# Patient Record
Sex: MALE | Race: BLACK OR AFRICAN AMERICAN | Employment: UNEMPLOYED | URBAN - NONMETROPOLITAN AREA
[De-identification: names, ages, dates, MRNs, and addresses within clinical notes are randomized per-mention and may not be internally consistent; named-entity substitution may affect disease eponyms.]

---

## 2024-07-29 DIAGNOSIS — M17.12 PRIMARY OSTEOARTHRITIS OF LEFT KNEE: Primary | ICD-10-CM

## 2024-07-29 RX ORDER — LIDOCAINE HYDROCHLORIDE 10 MG/ML
8 INJECTION, SOLUTION INFILTRATION; PERINEURAL ONCE
Status: CANCELLED | OUTPATIENT
Start: 2024-07-29 | End: 2024-07-29

## 2024-07-29 RX ORDER — TRIAMCINOLONE ACETONIDE 40 MG/ML
80 INJECTION, SUSPENSION INTRA-ARTICULAR; INTRAMUSCULAR ONCE
Status: CANCELLED | OUTPATIENT
Start: 2024-07-29 | End: 2024-07-29

## 2024-07-29 NOTE — PROGRESS NOTES
Patient ID:  Geovanny Isaac is a 59 y.o. male who presents today for evaluation of left knee pain.    Injury: no  Metal Allergy: no    Location: left knee pain, located on the global aspect of the knee  Pain: yes; 8 on a scale of 1 to 10  Onset: gradual  Duration: 5 years  Frequency:  occurs daily  Quality: aching and boring   Swelling: patient notes intermittent swelling of the joint  Aggravating factors: weight bearing activity, standing, and walking  Alleviating factors: removing weight from leg and rest  Mechanical symptoms: none  Radiation: no    Activities: walking independently  Restriction:  decreased ambulatory tolerance  Progression:  worsening    Previous treatment:  anti-inflammatories, steroid injection , and viscosupplementation   NSAIDs:  ibuprofen/motrin and naproxyn  PT:  none    Medications:    No current outpatient medications on file prior to visit.     No current facility-administered medications on file prior to visit.       Allergies:    Not on File    Past Medical History:    No past medical history on file.    Past Surgical History:    No past surgical history on file.    Social History:    Social History     Socioeconomic History    Marital status: Single     Spouse name: Not on file    Number of children: Not on file    Years of education: Not on file    Highest education level: Not on file   Occupational History    Not on file   Tobacco Use    Smoking status: Not on file    Smokeless tobacco: Not on file   Substance and Sexual Activity    Alcohol use: Not on file    Drug use: Not on file    Sexual activity: Not on file   Other Topics Concern    Not on file   Social History Narrative    Not on file     Social Determinants of Health     Financial Resource Strain: Not on file   Food Insecurity: Not on file   Transportation Needs: Not on file   Physical Activity: Not on file   Stress: Not on file   Social Connections: Not on file   Intimate Partner Violence: Not on file   Housing Stability: Not

## 2024-07-30 ENCOUNTER — OFFICE VISIT (OUTPATIENT)
Dept: ORTHOPEDIC SURGERY | Age: 59
End: 2024-07-30
Payer: MEDICARE

## 2024-07-30 VITALS
HEART RATE: 89 BPM | OXYGEN SATURATION: 97 % | TEMPERATURE: 97.3 F | BODY MASS INDEX: 38.6 KG/M2 | WEIGHT: 285 LBS | HEIGHT: 72 IN

## 2024-07-30 DIAGNOSIS — M17.12 PRIMARY OSTEOARTHRITIS OF LEFT KNEE: Primary | ICD-10-CM

## 2024-07-30 PROCEDURE — 99204 OFFICE O/P NEW MOD 45 MIN: CPT | Performed by: ORTHOPAEDIC SURGERY

## 2024-07-30 PROCEDURE — 1036F TOBACCO NON-USER: CPT | Performed by: ORTHOPAEDIC SURGERY

## 2024-07-30 PROCEDURE — G8427 DOCREV CUR MEDS BY ELIG CLIN: HCPCS | Performed by: ORTHOPAEDIC SURGERY

## 2024-07-30 PROCEDURE — G8419 CALC BMI OUT NRM PARAM NOF/U: HCPCS | Performed by: ORTHOPAEDIC SURGERY

## 2024-07-30 PROCEDURE — 3017F COLORECTAL CA SCREEN DOC REV: CPT | Performed by: ORTHOPAEDIC SURGERY

## 2024-07-30 RX ORDER — LEVOTHYROXINE SODIUM 0.05 MG/1
TABLET ORAL
COMMUNITY
Start: 2018-01-12

## 2024-07-30 RX ORDER — GABAPENTIN 600 MG/1
600 TABLET ORAL 2 TIMES DAILY
COMMUNITY
Start: 2019-03-22 | End: 2024-08-11

## 2024-07-30 RX ORDER — DOXYCYCLINE HYCLATE 100 MG
100 TABLET ORAL EVERY 12 HOURS
COMMUNITY
Start: 2022-11-29

## 2024-07-30 RX ORDER — ALLOPURINOL 100 MG/1
100 TABLET ORAL DAILY
COMMUNITY

## 2024-07-30 RX ORDER — ROSUVASTATIN CALCIUM 20 MG/1
20 TABLET, COATED ORAL DAILY
COMMUNITY
Start: 2024-02-27 | End: 2024-08-25

## 2024-07-30 RX ORDER — POTASSIUM CHLORIDE 1500 MG/1
20 TABLET, EXTENDED RELEASE ORAL DAILY
COMMUNITY
Start: 2019-03-22 | End: 2024-11-12

## 2024-07-30 RX ORDER — ALBUTEROL SULFATE 90 UG/1
1 AEROSOL, METERED RESPIRATORY (INHALATION) EVERY 4 HOURS PRN
COMMUNITY
Start: 2023-06-30

## 2024-07-30 RX ORDER — PREDNISONE 20 MG/1
TABLET ORAL
COMMUNITY
Start: 2024-06-15

## 2024-07-30 RX ORDER — TRIAMTERENE AND HYDROCHLOROTHIAZIDE 75; 50 MG/1; MG/1
1 TABLET ORAL EVERY MORNING
COMMUNITY
Start: 2024-04-09 | End: 2024-10-06

## 2024-07-30 RX ORDER — NAPROXEN 500 MG/1
1 TABLET ORAL DAILY
COMMUNITY
Start: 2023-12-11

## 2024-07-30 ASSESSMENT — ENCOUNTER SYMPTOMS
ABDOMINAL PAIN: 0
DIARRHEA: 0
EYE DISCHARGE: 0
SHORTNESS OF BREATH: 0
CONSTIPATION: 0
EYE PAIN: 0
EYE ITCHING: 0
COUGH: 0

## 2024-08-08 ENCOUNTER — PREP FOR PROCEDURE (OUTPATIENT)
Dept: ORTHOPEDIC SURGERY | Age: 59
End: 2024-08-08

## 2024-08-08 PROBLEM — M17.12 OSTEOARTHRITIS OF LEFT KNEE: Status: ACTIVE | Noted: 2024-08-08

## 2024-08-20 ENCOUNTER — HOSPITAL ENCOUNTER (OUTPATIENT)
Dept: PREADMISSION TESTING | Age: 59
Discharge: HOME | End: 2024-08-24
Payer: MEDICARE

## 2024-08-20 ENCOUNTER — OFFICE VISIT (OUTPATIENT)
Dept: FAMILY MEDICINE CLINIC | Age: 59
End: 2024-08-20
Payer: MEDICARE

## 2024-08-20 VITALS
WEIGHT: 286.2 LBS | BODY MASS INDEX: 38.76 KG/M2 | SYSTOLIC BLOOD PRESSURE: 126 MMHG | OXYGEN SATURATION: 97 % | HEART RATE: 102 BPM | DIASTOLIC BLOOD PRESSURE: 78 MMHG | HEIGHT: 72 IN

## 2024-08-20 DIAGNOSIS — N30.90 CYSTITIS: ICD-10-CM

## 2024-08-20 DIAGNOSIS — Z01.818 PRE-OP EXAMINATION: Primary | ICD-10-CM

## 2024-08-20 DIAGNOSIS — R73.9 ELEVATED BLOOD SUGAR LEVEL: ICD-10-CM

## 2024-08-20 LAB
ANION GAP SERPL CALCULATED.3IONS-SCNC: 12 MEQ/L (ref 9–15)
ANISOCYTOSIS BLD QL SMEAR: ABNORMAL
BASOPHILS # BLD: 0.1 K/UL (ref 0–0.2)
BASOPHILS NFR BLD: 0.7 %
BILIRUB UR QL STRIP: NEGATIVE
BUN SERPL-MCNC: 41 MG/DL (ref 6–20)
CALCIUM SERPL-MCNC: 9.8 MG/DL (ref 8.5–9.9)
CHLORIDE SERPL-SCNC: 95 MEQ/L (ref 95–107)
CLARITY UR: CLEAR
CO2 SERPL-SCNC: 28 MEQ/L (ref 20–31)
COLOR UR: YELLOW
CREAT SERPL-MCNC: 1.83 MG/DL (ref 0.7–1.2)
EOSINOPHIL # BLD: 0.5 K/UL (ref 0–0.7)
EOSINOPHIL NFR BLD: 4.9 %
ERYTHROCYTE [DISTWIDTH] IN BLOOD BY AUTOMATED COUNT: 15.7 % (ref 11.5–14.5)
GLUCOSE SERPL-MCNC: 99 MG/DL (ref 70–99)
GLUCOSE UR STRIP-MCNC: NEGATIVE MG/DL
HCT VFR BLD AUTO: 44.5 % (ref 42–52)
HGB BLD-MCNC: 13.9 G/DL (ref 14–18)
HGB UR QL STRIP: NEGATIVE
INR PPP: 1
KETONES UR STRIP-MCNC: NEGATIVE MG/DL
LEUKOCYTE ESTERASE UR QL STRIP: NEGATIVE
LYMPHOCYTES # BLD: 3.3 K/UL (ref 1–4.8)
LYMPHOCYTES NFR BLD: 32.7 %
MACROCYTES BLD QL SMEAR: ABNORMAL
MCH RBC QN AUTO: 22.9 PG (ref 27–31.3)
MCHC RBC AUTO-ENTMCNC: 31.2 % (ref 33–37)
MCV RBC AUTO: 73.2 FL (ref 79–92.2)
MONOCYTES # BLD: 0.6 K/UL (ref 0.2–0.8)
MONOCYTES NFR BLD: 5.5 %
NEUTROPHILS # BLD: 5.7 K/UL (ref 1.4–6.5)
NEUTS SEG NFR BLD: 55.9 %
NITRITE UR QL STRIP: NEGATIVE
OVALOCYTES BLD QL SMEAR: ABNORMAL
PH UR STRIP: 6.5 [PH] (ref 5–9)
PLATELET # BLD AUTO: 351 K/UL (ref 130–400)
PLATELET BLD QL SMEAR: ADEQUATE
POIKILOCYTOSIS BLD QL SMEAR: ABNORMAL
POLYCHROMASIA BLD QL SMEAR: ABNORMAL
POTASSIUM SERPL-SCNC: 3.2 MEQ/L (ref 3.4–4.9)
PROT UR STRIP-MCNC: NEGATIVE MG/DL
PROTHROMBIN TIME: 13.5 SEC (ref 12.3–14.9)
RBC # BLD AUTO: 6.08 M/UL (ref 4.7–6.1)
SLIDE REVIEW: ABNORMAL
SODIUM SERPL-SCNC: 135 MEQ/L (ref 135–144)
SP GR UR STRIP: 1.01 (ref 1–1.03)
TARGETS BLD QL SMEAR: ABNORMAL
UROBILINOGEN UR STRIP-ACNC: 0.2 E.U./DL
WBC # BLD AUTO: 10.2 K/UL (ref 4.8–10.8)

## 2024-08-20 PROCEDURE — 83036 HEMOGLOBIN GLYCOSYLATED A1C: CPT

## 2024-08-20 PROCEDURE — 86850 RBC ANTIBODY SCREEN: CPT

## 2024-08-20 PROCEDURE — 81003 URINALYSIS AUTO W/O SCOPE: CPT

## 2024-08-20 PROCEDURE — 99203 OFFICE O/P NEW LOW 30 MIN: CPT | Performed by: NURSE PRACTITIONER

## 2024-08-20 PROCEDURE — 85025 COMPLETE CBC W/AUTO DIFF WBC: CPT

## 2024-08-20 PROCEDURE — 86900 BLOOD TYPING SEROLOGIC ABO: CPT

## 2024-08-20 PROCEDURE — 87641 MR-STAPH DNA AMP PROBE: CPT

## 2024-08-20 PROCEDURE — 87086 URINE CULTURE/COLONY COUNT: CPT

## 2024-08-20 PROCEDURE — 86901 BLOOD TYPING SEROLOGIC RH(D): CPT

## 2024-08-20 PROCEDURE — 80048 BASIC METABOLIC PNL TOTAL CA: CPT

## 2024-08-20 PROCEDURE — 93000 ELECTROCARDIOGRAM COMPLETE: CPT | Performed by: NURSE PRACTITIONER

## 2024-08-20 PROCEDURE — 85610 PROTHROMBIN TIME: CPT

## 2024-08-20 RX ORDER — SODIUM CHLORIDE, SODIUM LACTATE, POTASSIUM CHLORIDE, CALCIUM CHLORIDE 600; 310; 30; 20 MG/100ML; MG/100ML; MG/100ML; MG/100ML
INJECTION, SOLUTION INTRAVENOUS CONTINUOUS
OUTPATIENT
Start: 2024-08-27

## 2024-08-20 RX ORDER — SODIUM CHLORIDE 0.9 % (FLUSH) 0.9 %
5-40 SYRINGE (ML) INJECTION PRN
OUTPATIENT
Start: 2024-08-27

## 2024-08-20 RX ORDER — SODIUM CHLORIDE 9 MG/ML
INJECTION, SOLUTION INTRAVENOUS PRN
OUTPATIENT
Start: 2024-08-27

## 2024-08-20 RX ORDER — CHLORHEXIDINE GLUCONATE 40 MG/ML
SOLUTION TOPICAL DAILY
Qty: 236 ML | Refills: 0 | Status: SHIPPED | OUTPATIENT
Start: 2024-08-20

## 2024-08-20 RX ORDER — SODIUM CHLORIDE 0.9 % (FLUSH) 0.9 %
5-40 SYRINGE (ML) INJECTION EVERY 12 HOURS SCHEDULED
OUTPATIENT
Start: 2024-08-27

## 2024-08-20 SDOH — ECONOMIC STABILITY: INCOME INSECURITY: HOW HARD IS IT FOR YOU TO PAY FOR THE VERY BASICS LIKE FOOD, HOUSING, MEDICAL CARE, AND HEATING?: NOT HARD AT ALL

## 2024-08-20 SDOH — ECONOMIC STABILITY: FOOD INSECURITY: WITHIN THE PAST 12 MONTHS, THE FOOD YOU BOUGHT JUST DIDN'T LAST AND YOU DIDN'T HAVE MONEY TO GET MORE.: NEVER TRUE

## 2024-08-20 SDOH — ECONOMIC STABILITY: FOOD INSECURITY: WITHIN THE PAST 12 MONTHS, YOU WORRIED THAT YOUR FOOD WOULD RUN OUT BEFORE YOU GOT MONEY TO BUY MORE.: NEVER TRUE

## 2024-08-20 ASSESSMENT — PATIENT HEALTH QUESTIONNAIRE - PHQ9
SUM OF ALL RESPONSES TO PHQ QUESTIONS 1-9: 0
2. FEELING DOWN, DEPRESSED OR HOPELESS: NOT AT ALL
1. LITTLE INTEREST OR PLEASURE IN DOING THINGS: NOT AT ALL
SUM OF ALL RESPONSES TO PHQ9 QUESTIONS 1 & 2: 0
SUM OF ALL RESPONSES TO PHQ QUESTIONS 1-9: 0

## 2024-08-20 NOTE — PROGRESS NOTES
Subjective  Geovanny Isaac, 59 y.o. male presents today with:  Chief Complaint   Patient presents with    Pre-op Exam     Patient is scheduled for left total knee arthroplasty - 8/27/24 . Dr. Melton        I reviewed staff HPI/chief complaint and do agree with above    Geovanny Isaac here for preop exam for left total knee replacement, to be performed by Dr. Melton on 8/27/2024.     They have a pertinent history of  Hypothyroidism, hypertension, lymphedema, obesity, stage III kidney disease     There is no known history of heart disease or infarction. There is no recent chest pain or discomfort, palpitations, shortness of breath, WOMACK, difficulties with exercise, bilateral ankle swelling. Not taking any blood thinners.     There is no known history of obstructive or restrictive lung disease. no smoking.  No recent wheezing or use of any kind of inhalers. No recent hospitalizations for any lung-related illnesses. No recent Covid infection.     No known history of gastric issues which includes ulcers, herniations, bariatric surgeries. No recent GI bleeds     No known history of hyper or hypercoagulable states.   They are not diabetic.                Review of Systems   Constitutional:  Negative for activity change, appetite change, chills, fatigue and fever.   HENT:  Negative for congestion, ear pain, postnasal drip, rhinorrhea, sinus pressure, sinus pain, sore throat and trouble swallowing.    Eyes:  Negative for photophobia, pain, redness and visual disturbance.   Respiratory:  Negative for cough, chest tightness and shortness of breath.    Cardiovascular:  Negative for chest pain, palpitations and leg swelling.   Gastrointestinal:  Negative for abdominal pain, blood in stool, constipation, diarrhea, nausea and vomiting.   Endocrine: Negative for cold intolerance, heat intolerance, polydipsia, polyphagia and polyuria.   Musculoskeletal:  Positive for arthralgias. Negative for joint swelling and myalgias.   Skin:

## 2024-08-21 LAB
ABO + RH BLD: NORMAL
BACTERIA UR CULT: NORMAL
BLD GP AB SCN SERPL QL: NORMAL
ESTIMATED AVERAGE GLUCOSE: 108 MG/DL
HBA1C MFR BLD: 5.4 % (ref 4–6)
MRSA, DNA, NASAL: NEGATIVE
SPECIMEN DESCRIPTION: NORMAL

## 2024-08-21 ASSESSMENT — ENCOUNTER SYMPTOMS
COUGH: 0
DIARRHEA: 0
CONSTIPATION: 0
EYE REDNESS: 0
EYE PAIN: 0
BLOOD IN STOOL: 0
COLOR CHANGE: 0
VOMITING: 0
PHOTOPHOBIA: 0
SINUS PRESSURE: 0
CHEST TIGHTNESS: 0
RHINORRHEA: 0
ABDOMINAL PAIN: 0
SORE THROAT: 0
SINUS PAIN: 0
NAUSEA: 0
SHORTNESS OF BREATH: 0
TROUBLE SWALLOWING: 0

## 2024-08-22 NOTE — PATIENT INSTRUCTIONS
-please ensure that blood work is done at least 3 days before your surgery at the preadmission testing site (located at surgery check in - Outpatient Center - entrance B)     -stop taking asprin and motrin until after your surgery. All blood thinners need to be stopped at least 5 days in advance prior to surgery. If you experiencing pain, the only medication you can take for that is tylenol 3-4x / day not to exceed 4000 mg.    -The morning of surgery, hold all regular medications aside from Synthroid can take the morning of surgery with sip of water     -Hibiclens was sent to your pharmacy to . Please follow the instructions provided with the prescription and use daily starting 5 days before surgery.      -no eating / drinking the after midnight the night before surgery. Sips of water the morning of for all other medications is OK     -our surgery department will reach out the you the day before your surgery and let you know what time to arrive at the Outpatient Center (door B, same place as where you got blood work)

## 2024-08-27 ENCOUNTER — APPOINTMENT (OUTPATIENT)
Dept: GENERAL RADIOLOGY | Age: 59
End: 2024-08-27
Attending: ORTHOPAEDIC SURGERY
Payer: MEDICARE

## 2024-08-27 ENCOUNTER — ANESTHESIA EVENT (OUTPATIENT)
Dept: OPERATING ROOM | Age: 59
End: 2024-08-27
Payer: MEDICARE

## 2024-08-27 ENCOUNTER — HOSPITAL ENCOUNTER (OUTPATIENT)
Age: 59
Setting detail: OBSERVATION
Discharge: HOME HEALTH | End: 2024-08-28
Attending: ORTHOPAEDIC SURGERY | Admitting: ORTHOPAEDIC SURGERY
Payer: MEDICARE

## 2024-08-27 ENCOUNTER — APPOINTMENT (OUTPATIENT)
Dept: ULTRASOUND IMAGING | Age: 59
End: 2024-08-27
Attending: ORTHOPAEDIC SURGERY
Payer: MEDICARE

## 2024-08-27 ENCOUNTER — ANESTHESIA (OUTPATIENT)
Dept: OPERATING ROOM | Age: 59
End: 2024-08-27
Payer: MEDICARE

## 2024-08-27 DIAGNOSIS — G89.18 POST-OP PAIN: ICD-10-CM

## 2024-08-27 DIAGNOSIS — Z96.652 STATUS POST TOTAL LEFT KNEE REPLACEMENT: Primary | ICD-10-CM

## 2024-08-27 DIAGNOSIS — M17.12 OSTEOARTHRITIS OF LEFT KNEE: ICD-10-CM

## 2024-08-27 PROCEDURE — 6360000002 HC RX W HCPCS: Performed by: STUDENT IN AN ORGANIZED HEALTH CARE EDUCATION/TRAINING PROGRAM

## 2024-08-27 PROCEDURE — 3600000014 HC SURGERY LEVEL 4 ADDTL 15MIN: Performed by: ORTHOPAEDIC SURGERY

## 2024-08-27 PROCEDURE — 94664 DEMO&/EVAL PT USE INHALER: CPT

## 2024-08-27 PROCEDURE — 3600000004 HC SURGERY LEVEL 4 BASE: Performed by: ORTHOPAEDIC SURGERY

## 2024-08-27 PROCEDURE — C1776 JOINT DEVICE (IMPLANTABLE): HCPCS | Performed by: ORTHOPAEDIC SURGERY

## 2024-08-27 PROCEDURE — 97162 PT EVAL MOD COMPLEX 30 MIN: CPT

## 2024-08-27 PROCEDURE — 64447 NJX AA&/STRD FEMORAL NRV IMG: CPT | Performed by: STUDENT IN AN ORGANIZED HEALTH CARE EDUCATION/TRAINING PROGRAM

## 2024-08-27 PROCEDURE — 3700000001 HC ADD 15 MINUTES (ANESTHESIA): Performed by: ORTHOPAEDIC SURGERY

## 2024-08-27 PROCEDURE — 6370000000 HC RX 637 (ALT 250 FOR IP): Performed by: INTERNAL MEDICINE

## 2024-08-27 PROCEDURE — 7100000000 HC PACU RECOVERY - FIRST 15 MIN: Performed by: ORTHOPAEDIC SURGERY

## 2024-08-27 PROCEDURE — 64999 UNLISTED PX NERVOUS SYSTEM: CPT | Performed by: STUDENT IN AN ORGANIZED HEALTH CARE EDUCATION/TRAINING PROGRAM

## 2024-08-27 PROCEDURE — 73560 X-RAY EXAM OF KNEE 1 OR 2: CPT

## 2024-08-27 PROCEDURE — 2720000010 HC SURG SUPPLY STERILE: Performed by: ORTHOPAEDIC SURGERY

## 2024-08-27 PROCEDURE — 2709999900 HC NON-CHARGEABLE SUPPLY: Performed by: ORTHOPAEDIC SURGERY

## 2024-08-27 PROCEDURE — 3700000000 HC ANESTHESIA ATTENDED CARE: Performed by: ORTHOPAEDIC SURGERY

## 2024-08-27 PROCEDURE — 2580000003 HC RX 258: Performed by: NURSE PRACTITIONER

## 2024-08-27 PROCEDURE — 2580000003 HC RX 258: Performed by: PHYSICIAN ASSISTANT

## 2024-08-27 PROCEDURE — 2500000003 HC RX 250 WO HCPCS: Performed by: NURSE PRACTITIONER

## 2024-08-27 PROCEDURE — 6360000002 HC RX W HCPCS: Performed by: NURSE PRACTITIONER

## 2024-08-27 PROCEDURE — 6370000000 HC RX 637 (ALT 250 FOR IP): Performed by: NURSE PRACTITIONER

## 2024-08-27 PROCEDURE — 97530 THERAPEUTIC ACTIVITIES: CPT

## 2024-08-27 PROCEDURE — 2580000003 HC RX 258: Performed by: ORTHOPAEDIC SURGERY

## 2024-08-27 PROCEDURE — A4217 STERILE WATER/SALINE, 500 ML: HCPCS | Performed by: ORTHOPAEDIC SURGERY

## 2024-08-27 PROCEDURE — 6360000002 HC RX W HCPCS: Performed by: PHYSICIAN ASSISTANT

## 2024-08-27 PROCEDURE — G0378 HOSPITAL OBSERVATION PER HR: HCPCS

## 2024-08-27 PROCEDURE — 7100000001 HC PACU RECOVERY - ADDTL 15 MIN: Performed by: ORTHOPAEDIC SURGERY

## 2024-08-27 PROCEDURE — 2500000003 HC RX 250 WO HCPCS: Performed by: STUDENT IN AN ORGANIZED HEALTH CARE EDUCATION/TRAINING PROGRAM

## 2024-08-27 PROCEDURE — 6360000002 HC RX W HCPCS: Performed by: ORTHOPAEDIC SURGERY

## 2024-08-27 PROCEDURE — 76942 ECHO GUIDE FOR BIOPSY: CPT

## 2024-08-27 DEVICE — TIBIAL BEARING INSERT - PS
Type: IMPLANTABLE DEVICE | Site: KNEE | Status: FUNCTIONAL
Brand: TRIATHLON

## 2024-08-27 DEVICE — COMPONENT PART KNEE CAPPED K1 STRYKER: Type: IMPLANTABLE DEVICE | Site: KNEE | Status: FUNCTIONAL

## 2024-08-27 DEVICE — ASYMMETRIC PATELLA
Type: IMPLANTABLE DEVICE | Site: KNEE | Status: FUNCTIONAL
Brand: TRIATHLON

## 2024-08-27 DEVICE — CEMENT BNE RADIOPAQUE FAST SET ACRYL RESIN HI VISC SIMPLEXHV: Type: IMPLANTABLE DEVICE | Site: KNEE | Status: FUNCTIONAL

## 2024-08-27 DEVICE — UNIVERSAL TIBIAL BASEPLATE
Type: IMPLANTABLE DEVICE | Site: KNEE | Status: FUNCTIONAL
Brand: TRIATHLON

## 2024-08-27 DEVICE — POSTERIOR STABILIZED FEMORAL
Type: IMPLANTABLE DEVICE | Site: KNEE | Status: FUNCTIONAL
Brand: TRIATHLON

## 2024-08-27 RX ORDER — SODIUM CHLORIDE 0.9 % (FLUSH) 0.9 %
5-40 SYRINGE (ML) INJECTION PRN
Status: DISCONTINUED | OUTPATIENT
Start: 2024-08-27 | End: 2024-08-27 | Stop reason: HOSPADM

## 2024-08-27 RX ORDER — ROPIVACAINE HYDROCHLORIDE 5 MG/ML
INJECTION, SOLUTION EPIDURAL; INFILTRATION; PERINEURAL
Status: COMPLETED | OUTPATIENT
Start: 2024-08-27 | End: 2024-08-27

## 2024-08-27 RX ORDER — OXYCODONE HYDROCHLORIDE 5 MG/1
5 TABLET ORAL EVERY 4 HOURS PRN
Status: DISCONTINUED | OUTPATIENT
Start: 2024-08-27 | End: 2024-08-28 | Stop reason: HOSPADM

## 2024-08-27 RX ORDER — LIDOCAINE HYDROCHLORIDE 10 MG/ML
INJECTION, SOLUTION INFILTRATION; PERINEURAL
Status: COMPLETED | OUTPATIENT
Start: 2024-08-27 | End: 2024-08-27

## 2024-08-27 RX ORDER — SODIUM CHLORIDE, SODIUM LACTATE, POTASSIUM CHLORIDE, CALCIUM CHLORIDE 600; 310; 30; 20 MG/100ML; MG/100ML; MG/100ML; MG/100ML
INJECTION, SOLUTION INTRAVENOUS CONTINUOUS
Status: DISCONTINUED | OUTPATIENT
Start: 2024-08-27 | End: 2024-08-27 | Stop reason: HOSPADM

## 2024-08-27 RX ORDER — OXYCODONE HYDROCHLORIDE 5 MG/1
5 TABLET ORAL PRN
Status: DISCONTINUED | OUTPATIENT
Start: 2024-08-27 | End: 2024-08-27 | Stop reason: HOSPADM

## 2024-08-27 RX ORDER — SODIUM CHLORIDE 9 MG/ML
INJECTION, SOLUTION INTRAVENOUS CONTINUOUS
Status: DISCONTINUED | OUTPATIENT
Start: 2024-08-27 | End: 2024-08-28

## 2024-08-27 RX ORDER — PROCHLORPERAZINE EDISYLATE 5 MG/ML
5 INJECTION INTRAMUSCULAR; INTRAVENOUS
Status: DISCONTINUED | OUTPATIENT
Start: 2024-08-27 | End: 2024-08-27 | Stop reason: HOSPADM

## 2024-08-27 RX ORDER — OXYCODONE HCL 10 MG/1
10 TABLET, FILM COATED, EXTENDED RELEASE ORAL ONCE
Status: COMPLETED | OUTPATIENT
Start: 2024-08-27 | End: 2024-08-27

## 2024-08-27 RX ORDER — SODIUM CHLORIDE 9 MG/ML
INJECTION, SOLUTION INTRAVENOUS PRN
Status: DISCONTINUED | OUTPATIENT
Start: 2024-08-27 | End: 2024-08-27 | Stop reason: HOSPADM

## 2024-08-27 RX ORDER — DIPHENHYDRAMINE HYDROCHLORIDE 50 MG/ML
12.5 INJECTION INTRAMUSCULAR; INTRAVENOUS
Status: DISCONTINUED | OUTPATIENT
Start: 2024-08-27 | End: 2024-08-27 | Stop reason: HOSPADM

## 2024-08-27 RX ORDER — SODIUM CHLORIDE 0.9 % (FLUSH) 0.9 %
5-40 SYRINGE (ML) INJECTION EVERY 12 HOURS SCHEDULED
Status: DISCONTINUED | OUTPATIENT
Start: 2024-08-27 | End: 2024-08-27 | Stop reason: HOSPADM

## 2024-08-27 RX ORDER — ROSUVASTATIN CALCIUM 20 MG/1
20 TABLET, COATED ORAL NIGHTLY
Status: DISCONTINUED | OUTPATIENT
Start: 2024-08-27 | End: 2024-08-28 | Stop reason: HOSPADM

## 2024-08-27 RX ORDER — OXYCODONE HYDROCHLORIDE 5 MG/1
2.5 TABLET ORAL EVERY 4 HOURS PRN
Status: DISCONTINUED | OUTPATIENT
Start: 2024-08-27 | End: 2024-08-28 | Stop reason: HOSPADM

## 2024-08-27 RX ORDER — HYDRALAZINE HYDROCHLORIDE 20 MG/ML
10 INJECTION INTRAMUSCULAR; INTRAVENOUS
Status: DISCONTINUED | OUTPATIENT
Start: 2024-08-27 | End: 2024-08-27 | Stop reason: HOSPADM

## 2024-08-27 RX ORDER — ACETAMINOPHEN 325 MG/1
650 TABLET ORAL EVERY 6 HOURS
Status: DISCONTINUED | OUTPATIENT
Start: 2024-08-27 | End: 2024-08-28 | Stop reason: HOSPADM

## 2024-08-27 RX ORDER — PROPOFOL 10 MG/ML
INJECTION, EMULSION INTRAVENOUS CONTINUOUS PRN
Status: DISCONTINUED | OUTPATIENT
Start: 2024-08-27 | End: 2024-08-27 | Stop reason: SDUPTHER

## 2024-08-27 RX ORDER — MAGNESIUM HYDROXIDE 1200 MG/15ML
LIQUID ORAL CONTINUOUS PRN
Status: DISCONTINUED | OUTPATIENT
Start: 2024-08-27 | End: 2024-08-27 | Stop reason: HOSPADM

## 2024-08-27 RX ORDER — MIDAZOLAM HYDROCHLORIDE 1 MG/ML
INJECTION INTRAMUSCULAR; INTRAVENOUS
Status: COMPLETED | OUTPATIENT
Start: 2024-08-27 | End: 2024-08-27

## 2024-08-27 RX ORDER — MEPERIDINE HYDROCHLORIDE 25 MG/ML
12.5 INJECTION INTRAMUSCULAR; INTRAVENOUS; SUBCUTANEOUS EVERY 5 MIN PRN
Status: DISCONTINUED | OUTPATIENT
Start: 2024-08-27 | End: 2024-08-27 | Stop reason: HOSPADM

## 2024-08-27 RX ORDER — ONDANSETRON 2 MG/ML
4 INJECTION INTRAMUSCULAR; INTRAVENOUS
Status: DISCONTINUED | OUTPATIENT
Start: 2024-08-27 | End: 2024-08-27 | Stop reason: HOSPADM

## 2024-08-27 RX ORDER — LEVOTHYROXINE SODIUM 25 UG/1
50 TABLET ORAL DAILY
Status: DISCONTINUED | OUTPATIENT
Start: 2024-08-27 | End: 2024-08-28 | Stop reason: HOSPADM

## 2024-08-27 RX ORDER — OXYCODONE HYDROCHLORIDE 5 MG/1
10 TABLET ORAL PRN
Status: DISCONTINUED | OUTPATIENT
Start: 2024-08-27 | End: 2024-08-27 | Stop reason: HOSPADM

## 2024-08-27 RX ORDER — FENTANYL CITRATE 0.05 MG/ML
50 INJECTION, SOLUTION INTRAMUSCULAR; INTRAVENOUS EVERY 5 MIN PRN
Status: DISCONTINUED | OUTPATIENT
Start: 2024-08-27 | End: 2024-08-27 | Stop reason: HOSPADM

## 2024-08-27 RX ORDER — ALLOPURINOL 100 MG/1
100 TABLET ORAL DAILY
Status: DISCONTINUED | OUTPATIENT
Start: 2024-08-27 | End: 2024-08-28 | Stop reason: HOSPADM

## 2024-08-27 RX ORDER — NALOXONE HYDROCHLORIDE 0.4 MG/ML
INJECTION, SOLUTION INTRAMUSCULAR; INTRAVENOUS; SUBCUTANEOUS PRN
Status: DISCONTINUED | OUTPATIENT
Start: 2024-08-27 | End: 2024-08-27 | Stop reason: HOSPADM

## 2024-08-27 RX ORDER — CELECOXIB 100 MG/1
200 CAPSULE ORAL ONCE
Status: COMPLETED | OUTPATIENT
Start: 2024-08-27 | End: 2024-08-27

## 2024-08-27 RX ORDER — SODIUM CHLORIDE 0.9 % (FLUSH) 0.9 %
5-40 SYRINGE (ML) INJECTION EVERY 12 HOURS SCHEDULED
Status: DISCONTINUED | OUTPATIENT
Start: 2024-08-27 | End: 2024-08-28 | Stop reason: HOSPADM

## 2024-08-27 RX ORDER — ACETAMINOPHEN 500 MG
1000 TABLET ORAL ONCE
Status: COMPLETED | OUTPATIENT
Start: 2024-08-27 | End: 2024-08-27

## 2024-08-27 RX ORDER — KETOROLAC TROMETHAMINE 15 MG/ML
7.5 INJECTION, SOLUTION INTRAMUSCULAR; INTRAVENOUS EVERY 6 HOURS
Status: COMPLETED | OUTPATIENT
Start: 2024-08-27 | End: 2024-08-28

## 2024-08-27 RX ORDER — MORPHINE SULFATE 2 MG/ML
2 INJECTION, SOLUTION INTRAMUSCULAR; INTRAVENOUS
Status: DISCONTINUED | OUTPATIENT
Start: 2024-08-27 | End: 2024-08-28 | Stop reason: HOSPADM

## 2024-08-27 RX ORDER — FERROUS SULFATE 325(65) MG
325 TABLET ORAL DAILY
Status: DISCONTINUED | OUTPATIENT
Start: 2024-08-27 | End: 2024-08-28 | Stop reason: HOSPADM

## 2024-08-27 RX ORDER — SODIUM CHLORIDE 9 MG/ML
INJECTION, SOLUTION INTRAVENOUS PRN
Status: DISCONTINUED | OUTPATIENT
Start: 2024-08-27 | End: 2024-08-28 | Stop reason: HOSPADM

## 2024-08-27 RX ORDER — SODIUM CHLORIDE 0.9 % (FLUSH) 0.9 %
5-40 SYRINGE (ML) INJECTION PRN
Status: DISCONTINUED | OUTPATIENT
Start: 2024-08-27 | End: 2024-08-28 | Stop reason: HOSPADM

## 2024-08-27 RX ORDER — ONDANSETRON 4 MG/1
4 TABLET, ORALLY DISINTEGRATING ORAL EVERY 8 HOURS PRN
Status: DISCONTINUED | OUTPATIENT
Start: 2024-08-27 | End: 2024-08-28 | Stop reason: HOSPADM

## 2024-08-27 RX ORDER — ASPIRIN 81 MG/1
81 TABLET ORAL 2 TIMES DAILY
Status: DISCONTINUED | OUTPATIENT
Start: 2024-08-27 | End: 2024-08-28 | Stop reason: HOSPADM

## 2024-08-27 RX ORDER — LABETALOL HYDROCHLORIDE 5 MG/ML
10 INJECTION, SOLUTION INTRAVENOUS
Status: DISCONTINUED | OUTPATIENT
Start: 2024-08-27 | End: 2024-08-27 | Stop reason: HOSPADM

## 2024-08-27 RX ORDER — ONDANSETRON 2 MG/ML
4 INJECTION INTRAMUSCULAR; INTRAVENOUS EVERY 6 HOURS PRN
Status: DISCONTINUED | OUTPATIENT
Start: 2024-08-27 | End: 2024-08-28 | Stop reason: HOSPADM

## 2024-08-27 RX ORDER — HYDROXYZINE HYDROCHLORIDE 10 MG/1
10 TABLET, FILM COATED ORAL 3 TIMES DAILY
Status: DISCONTINUED | OUTPATIENT
Start: 2024-08-27 | End: 2024-08-28 | Stop reason: HOSPADM

## 2024-08-27 RX ORDER — ALBUTEROL SULFATE 90 UG/1
1 AEROSOL, METERED RESPIRATORY (INHALATION) EVERY 4 HOURS PRN
Status: DISCONTINUED | OUTPATIENT
Start: 2024-08-27 | End: 2024-08-28 | Stop reason: HOSPADM

## 2024-08-27 RX ORDER — BUPIVACAINE HYDROCHLORIDE 7.5 MG/ML
INJECTION, SOLUTION INTRASPINAL
Status: COMPLETED | OUTPATIENT
Start: 2024-08-27 | End: 2024-08-27

## 2024-08-27 RX ORDER — SENNA AND DOCUSATE SODIUM 50; 8.6 MG/1; MG/1
1 TABLET, FILM COATED ORAL 2 TIMES DAILY
Status: DISCONTINUED | OUTPATIENT
Start: 2024-08-27 | End: 2024-08-28 | Stop reason: HOSPADM

## 2024-08-27 RX ORDER — GABAPENTIN 300 MG/1
600 CAPSULE ORAL 2 TIMES DAILY
Status: DISCONTINUED | OUTPATIENT
Start: 2024-08-27 | End: 2024-08-28 | Stop reason: HOSPADM

## 2024-08-27 RX ORDER — CYCLOBENZAPRINE HCL 10 MG
10 TABLET ORAL 3 TIMES DAILY PRN
Status: DISCONTINUED | OUTPATIENT
Start: 2024-08-27 | End: 2024-08-28 | Stop reason: HOSPADM

## 2024-08-27 RX ORDER — MAGNESIUM HYDROXIDE/ALUMINUM HYDROXICE/SIMETHICONE 120; 1200; 1200 MG/30ML; MG/30ML; MG/30ML
15 SUSPENSION ORAL EVERY 6 HOURS PRN
Status: DISCONTINUED | OUTPATIENT
Start: 2024-08-27 | End: 2024-08-28 | Stop reason: HOSPADM

## 2024-08-27 RX ORDER — FENTANYL CITRATE 0.05 MG/ML
25 INJECTION, SOLUTION INTRAMUSCULAR; INTRAVENOUS EVERY 5 MIN PRN
Status: DISCONTINUED | OUTPATIENT
Start: 2024-08-27 | End: 2024-08-27 | Stop reason: HOSPADM

## 2024-08-27 RX ORDER — POTASSIUM CHLORIDE 750 MG/1
20 TABLET, EXTENDED RELEASE ORAL DAILY
Status: DISCONTINUED | OUTPATIENT
Start: 2024-08-27 | End: 2024-08-28 | Stop reason: HOSPADM

## 2024-08-27 RX ADMIN — PROPOFOL 100 MG: 10 INJECTION, EMULSION INTRAVENOUS at 11:33

## 2024-08-27 RX ADMIN — ACETAMINOPHEN 1000 MG: 500 TABLET ORAL at 09:02

## 2024-08-27 RX ADMIN — SODIUM CHLORIDE: 9 INJECTION, SOLUTION INTRAVENOUS at 15:43

## 2024-08-27 RX ADMIN — ROSUVASTATIN CALCIUM 20 MG: 20 TABLET, COATED ORAL at 20:08

## 2024-08-27 RX ADMIN — GABAPENTIN 600 MG: 300 CAPSULE ORAL at 20:08

## 2024-08-27 RX ADMIN — ASPIRIN 81 MG: 81 TABLET, COATED ORAL at 20:08

## 2024-08-27 RX ADMIN — OXYCODONE 5 MG: 5 TABLET ORAL at 20:08

## 2024-08-27 RX ADMIN — SODIUM CHLORIDE 3000 MG: 9 INJECTION, SOLUTION INTRAVENOUS at 11:41

## 2024-08-27 RX ADMIN — SODIUM CHLORIDE 3000 MG: 9 INJECTION, SOLUTION INTRAVENOUS at 20:16

## 2024-08-27 RX ADMIN — LEVOTHYROXINE SODIUM 50 MCG: 0.03 TABLET ORAL at 17:42

## 2024-08-27 RX ADMIN — KETOROLAC TROMETHAMINE 7.5 MG: 15 INJECTION, SOLUTION INTRAMUSCULAR; INTRAVENOUS at 17:42

## 2024-08-27 RX ADMIN — PROPOFOL 100 MCG/KG/MIN: 10 INJECTION, EMULSION INTRAVENOUS at 11:32

## 2024-08-27 RX ADMIN — POTASSIUM CHLORIDE 20 MEQ: 750 TABLET, EXTENDED RELEASE ORAL at 17:42

## 2024-08-27 RX ADMIN — TRANEXAMIC ACID 1000 MG: 100 INJECTION, SOLUTION INTRAVENOUS at 12:02

## 2024-08-27 RX ADMIN — HYDROXYZINE HYDROCHLORIDE 10 MG: 10 TABLET ORAL at 20:08

## 2024-08-27 RX ADMIN — ALLOPURINOL 100 MG: 100 TABLET ORAL at 17:42

## 2024-08-27 RX ADMIN — ACETAMINOPHEN 650 MG: 325 TABLET ORAL at 15:45

## 2024-08-27 RX ADMIN — BUPIVACAINE HYDROCHLORIDE IN DEXTROSE 12 MG: 7.5 INJECTION, SOLUTION SUBARACHNOID at 11:21

## 2024-08-27 RX ADMIN — CELECOXIB 200 MG: 100 CAPSULE ORAL at 09:02

## 2024-08-27 RX ADMIN — LIDOCAINE HYDROCHLORIDE 2.5 ML: 10 INJECTION, SOLUTION INFILTRATION; PERINEURAL at 10:47

## 2024-08-27 RX ADMIN — ACETAMINOPHEN 650 MG: 325 TABLET ORAL at 20:06

## 2024-08-27 RX ADMIN — SODIUM CHLORIDE, POTASSIUM CHLORIDE, SODIUM LACTATE AND CALCIUM CHLORIDE: 600; 310; 30; 20 INJECTION, SOLUTION INTRAVENOUS at 09:07

## 2024-08-27 RX ADMIN — PROPOFOL 30 MG: 10 INJECTION, EMULSION INTRAVENOUS at 12:44

## 2024-08-27 RX ADMIN — ROPIVACAINE HYDROCHLORIDE 20 ML: 5 INJECTION, SOLUTION EPIDURAL; INFILTRATION; PERINEURAL at 10:47

## 2024-08-27 RX ADMIN — LIDOCAINE HYDROCHLORIDE 2.5 ML: 10 INJECTION, SOLUTION INFILTRATION; PERINEURAL at 10:43

## 2024-08-27 RX ADMIN — KETOROLAC TROMETHAMINE 7.5 MG: 15 INJECTION, SOLUTION INTRAMUSCULAR; INTRAVENOUS at 23:09

## 2024-08-27 RX ADMIN — ROPIVACAINE HYDROCHLORIDE 25 ML: 5 INJECTION, SOLUTION EPIDURAL; INFILTRATION; PERINEURAL at 10:52

## 2024-08-27 RX ADMIN — TRANEXAMIC ACID 1000 MG: 100 INJECTION, SOLUTION INTRAVENOUS at 13:44

## 2024-08-27 RX ADMIN — OXYCODONE HYDROCHLORIDE 10 MG: 10 TABLET, FILM COATED, EXTENDED RELEASE ORAL at 09:02

## 2024-08-27 RX ADMIN — MIDAZOLAM HYDROCHLORIDE 2 MG: 2 INJECTION, SOLUTION INTRAMUSCULAR; INTRAVENOUS at 10:43

## 2024-08-27 RX ADMIN — SENNOSIDES AND DOCUSATE SODIUM 1 TABLET: 50; 8.6 TABLET ORAL at 20:08

## 2024-08-27 RX ADMIN — FERROUS SULFATE TAB 325 MG (65 MG ELEMENTAL FE) 325 MG: 325 (65 FE) TAB at 17:42

## 2024-08-27 ASSESSMENT — PAIN SCALES - GENERAL
PAINLEVEL_OUTOF10: 0
PAINLEVEL_OUTOF10: 0
PAINLEVEL_OUTOF10: 9
PAINLEVEL_OUTOF10: 0
PAINLEVEL_OUTOF10: 5

## 2024-08-27 ASSESSMENT — PAIN DESCRIPTION - DESCRIPTORS
DESCRIPTORS: ACHING;PRESSURE;TINGLING
DESCRIPTORS: ACHING;PRESSURE

## 2024-08-27 ASSESSMENT — PAIN DESCRIPTION - ORIENTATION
ORIENTATION: LEFT
ORIENTATION: LEFT

## 2024-08-27 ASSESSMENT — PAIN - FUNCTIONAL ASSESSMENT
PAIN_FUNCTIONAL_ASSESSMENT: 0-10
PAIN_FUNCTIONAL_ASSESSMENT: ACTIVITIES ARE NOT PREVENTED
PAIN_FUNCTIONAL_ASSESSMENT: ACTIVITIES ARE NOT PREVENTED

## 2024-08-27 ASSESSMENT — PAIN DESCRIPTION - LOCATION
LOCATION: KNEE
LOCATION: KNEE

## 2024-08-27 NOTE — PROGRESS NOTES
1027  anesthesia at bedside, completed time out for left saphenous and IPACk block and spinal with patient , Dr. Baez and this RN. Pt placed on 2L oxygen via NC spo2 100 %.  1036 Nerve blocks complete Pt tana good. VS obtained see EMR.

## 2024-08-27 NOTE — ANESTHESIA PROCEDURE NOTES
Peripheral Block    Patient location during procedure: pre-op  Reason for block: post-op pain management and at surgeon's request  Start time: 8/27/2024 10:43 AM  End time: 8/27/2024 10:47 AM  Staffing  Performed: anesthesiologist   Anesthesiologist: Ankit Baez MD  Performed by: Ankit Baez MD  Authorized by: Ankit Baez MD    Preanesthetic Checklist  Completed: patient identified, IV checked, site marked, risks and benefits discussed, surgical/procedural consents, equipment checked, pre-op evaluation, timeout performed, anesthesia consent given, oxygen available and monitors applied/VS acknowledged  Peripheral Block   Patient position: supine  Prep: ChloraPrep  Provider prep: mask and sterile gloves (Sterile probe cover)  Patient monitoring: cardiac monitor, continuous pulse ox, frequent blood pressure checks and IV access  Block type: Saphenous  Laterality: left  Injection technique: single-shot  Guidance: ultrasound guided  Local infiltration: lidocaine  Infiltration strength: 1 %  Local infiltration: lidocaine  Dose: 5 mL    Needle   Needle type: combined needle/nerve stimulator   Needle gauge: 22 G  Needle localization: anatomical landmarks and ultrasound guidance  Needle length: 10 cm  Assessment   Injection assessment: negative aspiration for heme, no paresthesia on injection and local visualized surrounding nerve on ultrasound  Paresthesia pain: immediately resolved  Slow fractionated injection: yes  Hemodynamics: stable  Outcomes: uncomplicated and patient tolerated procedure well    Additional Notes  Ultrasound guidance used to view needle for placement.    Ultrasound image stored,  printed and saved in patient chart.    Sterile probe cover used    Medications Administered  midazolam (VERSED) injection 2 mg/2mL - IntraVENous   2 mg - 8/27/2024 10:43:00 AM  ropivacaine (NAROPIN) injection 0.5% - Perineural   20 mL - 8/27/2024 10:47:00 AM  lidocaine injection 1% - Subcuticular   2.5 mL -  8/27/2024 10:43:00 AM

## 2024-08-27 NOTE — DISCHARGE INSTR - COC
Continuity of Care Form    Patient Name: Geovanny Isaac   :  1965  MRN:  [id number]    Admit date:  2024  Discharge date:  24    Code Status Order: Full Code   Advance Directives:   Advance Care Flowsheet Documentation        Date/Time Healthcare Directive Type of Healthcare Directive Copy in Chart Healthcare Agent Appointed Healthcare Agent's Name Healthcare Agent's Phone Number    [phone] No, patient does not have an advance directive for healthcare treatment  --  --  --  --  --                     Admitting Physician:  Leonardo Melton MD  PCP: Carri Bolanos APRN - NP    Discharging Nurse: Savanna ESPINO  Discharging Hospital Unit/Room#: 0208/0208-01  Discharging Unit Phone Number: [phone]    Emergency Contact:   Extended Emergency Contact Information  Primary Emergency Contact: Mackenzie Ragland  Mobile Phone: [phone]  Relation: Girlfriend  Preferred language: English   needed? No    Past Surgical History:  History reviewed. No pertinent surgical history.    Immunization History:     There is no immunization history on file for this patient.    Active Problems:  Patient Active Problem List   Diagnosis Code    Osteoarthritis of left knee M17.12    Status post total knee replacement, left Z96.652       Isolation/Infection:   Isolation            No Isolation          Patient Infection Status       None to display            Nurse Assessment:  Last Vital Signs: BP (!) 108/59   Pulse 70   Temp 97.7 °F (36.5 °C)   Resp 11   Ht 1.829 m (6')   Wt 129.3 kg (285 lb)   SpO2 99%   BMI 38.65 kg/m²     Last documented pain score (0-10 scale): Pain Level: 0  Last Weight:   Wt Readings from Last 1 Encounters:   24 129.3 kg (285 lb)     Mental Status:  oriented, alert, coherent, logical, and thought processes intact    IV Access:  - None    Nursing Mobility/ADLs:  Walking   Independent  Transfer  Independent  Bathing  Independent  Dressing  Independent  Toileting

## 2024-08-27 NOTE — H&P
The history and physical in the electronic medical record dated 8/20/24 was personally reviewed.  There are no interval changes that need to be made.  Plan is to proceed with a right total knee as scheduled. The patient is opted to proceed with a knee replacement surgery.  I brought the model in, and we sat down and talked about surgery.  We talked about the recovery.  I stressed the importance of physical therapy and active participation in physical therapy to the patient in order to achieve a satisfactory postoperative outcome.  We went over the risks of surgery.  Risks include, but are not limited to, infection, neurovascular injury, hematoma formation, wound healing complications, blood clot, persistent postoperative pain, ligament injury, and risks of anesthesia.  The patient expressed understanding and wishes to proceed with a total knee replacement as above.

## 2024-08-27 NOTE — PROGRESS NOTES
Facility/Department: [facility] UNIT  Physical Therapy Initial Assessment    Name: Geovanny Isaac  : 1965  MRN: [id number]  Date of Service: 2024    Discharge Recommendations:  Continue to assess pending progress, Home with Home health PT          Patient Diagnosis(es): The primary encounter diagnosis was Status post total left knee replacement. A diagnosis of Osteoarthritis of left knee was also pertinent to this visit.  Past Medical History:  has no past medical history on file.  Past Surgical History:  has no past surgical history on file.    Assessment  Body Structures, Functions, Activity Limitations Requiring Skilled Therapeutic Intervention: Decreased functional mobility ;Decreased strength;Decreased ROM  Assessment: 59 year old male I PLOF adm to [facility] for elective L TKA. Pt currently requires assist with bed mobilty and transfers. Amb not assessed this date due to LE numbness and significant fatigue, difficulty keeping eyes open during evaluation. Pt would benefit from skilled PT treatment while hospitalized to address impairments, improve transfers, amb, ROM strength and overall functional moblity to allow safe D/C home.  Treatment Diagnosis: difficulty walking, weakness S/P L TKA  Therapy Prognosis: Good  Requires PT Follow-Up: Yes    Plan  Physical Therapy Plan  General Plan: 5-7 times per week (1-2x per day)  Current Treatment Recommendations: Strengthening, ROM, Balance training, Functional mobility training, Gait training, Stair training, Home exercise program, Patient/Caregiver education & training (TKA protocol)  Safety Devices  Type of Devices: Bed alarm in place, Call light within reach, Nurse notified, Left in bed    Restrictions  Restrictions/Precautions  Restrictions/Precautions: Surgical Protocols, Weight Bearing  Lower Extremity Weight Bearing Restrictions  Left Lower Extremity Weight Bearing: Weight Bearing As Tolerated     Subjective   Pt in bed and agreeable to

## 2024-08-27 NOTE — OP NOTE
Operative Note      Patient: Geovanny Isaac  YOB: 1965  MRN: [id number]    Date of Procedure: 8/27/2024    Pre-Op Diagnosis Codes:      * Osteoarthritis of left knee [M17.12]   Also synovial chondromatosis    Post-Op Diagnosis: Same       Procedure(s):  Left total knee arthroplasty; also an aggressive synovectomy to remove the heterotopic bone/synovial, dermatosis that was all through the joint    Surgeon(s):  Leonardo Melton MD    Assistant:   First Assistant: Cyndi Purvis -her assistance consisted of assistance with positioning of the limb, retraction and closing the wound    Anesthesia: Choice    Estimated Blood Loss (mL): less than 100     Complications: None    Specimens:   ID Type Source Tests Collected by Time Destination   A : Synovium Tissue Joint, Knee SURGICAL PATHOLOGY Leonardo Melton MD 8/27/2024 1215        Implants:  Implant Name Type Inv. Item Serial No.  Lot No. LRB No. Used Action   CEMENT BNE RADIOPAQUE FAST SET ACRYL RESIN HI VISC SIMPLEXHV - [id number]  CEMENT BNE RADIOPAQUE FAST SET ACRYL RESIN HI VISC SIMPLEXHV [id number] [employer] [id number] Left 1 Implanted   INSERT TIB PS 6 16 MM KNEE 5/PK X3 TRIATHLON - [id number]  INSERT TIB PS 6 16 MM KNEE 5/PK X3 TRIATHLON  [employer] [id number] Left 1 Implanted   IMPLANT PATELLAR [id number] X3 ASYMMETRIC TRIATHLON - [id number]  IMPLANT PATELLAR [id number] X3 ASYMMETRIC TRIATHLON  [employer] [id number] Left 1 Implanted   COMPONENT FEM SZ 6 L KNEE POST STBL KJ TRIATHLON - [id number]  COMPONENT FEM SZ 6 L KNEE POST STBL KJ TRIATHLON  [employer] [id number] Left 1 Implanted   BASEPLATE TIB SZ 6 UNIV KNEE TRITANIUM TOT STBL KJ - [id number]  BASEPLATE TIB SZ 6 UNIV KNEE TRITANIUM TOT STBL KJ  [employer] [id number] Left 1 Implanted         Drains:   Negative Pressure Wound Therapy Knee Left;Anterior (Active)       Findings:  Infection Present At Time Of  and the tibial trial was pinned in the place.  The knee was taken through range of motion.   The knee extended fully and flex well.  There was no varus or valgus laxity throughout the entire arc of motion.  There was a significant increase in range of motion with removal of the heterotopic bone.  The decision was made proceed with the size components.  The femoral component was removed along with the trial polyethylene liner.  The tibia was subluxed anteriorly and the keel was prepared.  The boss reamer was inserted in.  All trial components were then removed from the knee.  The tourniquet was then reinsufflated.  All cut bony surfaces were irrigated off with normal saline from the pulse lavage and dried.  Drill holes were made in the sclerotic bone.  The cement was mixed at the back table.  The size 6 tibia was cemented into place.  All excess cement was removed from around the tibial component.  The size 6 femoral component was then cemented into place, and likewise, all excess cement was removed from around the femoral component.  A trial 16 polyethylene liner was placed into the tibia and the knee was brought out into extension and held under an axial load.  At this point in time, the 38 mm patella was cemented in place and held there with a clamp.  Once the cemented cured, the clamp was removed and the trial polyethylene liner were removed.  All excess cement was removed from the knee.  The tourniquet was let down and hemostasis was obtained once again with the aqua Rosibel bipolar sealer.  The pain cocktail was injected into the soft tissues.  The 16 mm posterior stabilized polyethylene liner was then impacted into the tibial tray thereby engaging the locking mechanism.  The knee continued to extend fully and flex well.  There was no varus or valgus laxity throughout the entire arc of motion.  The knee was copiously irrigated out with normal saline from the pulse Avage.  The decision was made proceed with

## 2024-08-27 NOTE — DISCHARGE INSTRUCTIONS
Dr. Leonardo Melton Jr., MD  [facility]    Post Operative Instructions for Total Knee Replacement    Incision and dressing  Your incision is covered with an incisional wound vac.  This is to prevent fluid build up under the skin from removal of the excess bone that was in your knee  The dressing is to be removed 7 days after the date of your surgery.  Turn off the power button and peel off gently.  The incision has been closed with skin glue.  The glue will flake off over time and fall off on its own.  DO NOT apply any lotions, creams, peroxide or Betadine to the skin glue, as it will degrade and dissolve the glue.  DO NOT peel the glue off, as this could result in opening of the incision.  There are no sutures that need to be removed; the skin and subcutaneous layers have been closed with dissolvable sutures, which will dissolve over 7 to 8 weeks.    Showering  The vac dressing is waterproof.  You may shower when you feel ready.  Once the vac dressing has been removed, you may continue to shower.  The glue provides a waterproof seal to the incision.  Let the water run over the incision, and pat dry with a towel.  Do not scrub or rub the glue.    Compression stockings  The compression stockings/BRUNO hose are used to help reduce swelling and assist in the prevention of blood clots.  They are to be worn on the operative leg for 3 weeks.  They should be worn full-time during the day, but may be removed at nighttime or during periods of elevation during the day.  They are optional on the nonoperative leg, depending on how much swelling may be encountered.    Activity  You will begin activity immediately after surgery.  Physical therapy will commence (at home or as an outpatient) within a few days of surgery.  You may bear weight on the operative leg as tolerated.  It is encouraged that you get up for short walks frequently throughout the day.  Pump your ankles up and down at least 10 times every hour while  such as Colace or Dulcolax as recommended his pain medications can lead to constipation.  Take this as needed until your bowel function is normal.    Follow-up  You will have a follow-up appointment with Dr. Melton approximately 3 weeks after the date of your surgery.    CALL THE OFFICE ([phone]) if:  You run a fever of 100 degrees or higher that will not subside with Tylenol.  You noticed drainage from the incision.  You have worsening swelling or pain that is not relieved by rest or medication.

## 2024-08-27 NOTE — ANESTHESIA PROCEDURE NOTES
Spinal Block    Patient location during procedure: pre-op  End time: 8/27/2024 11:21 AM  Reason for block: primary anesthetic  Staffing  Performed: anesthesiologist   Anesthesiologist: Ankit Baez MD  Performed by: Ankit Baez MD  Authorized by: Ankit Baez MD    Spinal Block  Patient position: sitting  Prep: Betadine  Patient monitoring: cardiac monitor, continuous pulse ox and frequent blood pressure checks  Approach: midline  Location: L4/L5  Provider prep: mask and sterile gloves  Local infiltration: lidocaine  Needle  Needle type: Pencan   Needle gauge: 24 G  Needle length: 3.5 in  Assessment  Sensory level: T6  Events: cerebrospinal fluid  Swirl obtained: Yes (CSF aspirated)  CSF: clear  Attempts: 1  Hemodynamics: stable  Additional Notes  Free flowing CSF.  Negative heme.  Negative paresthesia.  .  Preanesthetic Checklist  Completed: patient identified, IV checked, site marked, risks and benefits discussed, surgical/procedural consents, equipment checked, pre-op evaluation, timeout performed, anesthesia consent given, oxygen available and monitors applied/VS acknowledged

## 2024-08-27 NOTE — ANESTHESIA PROCEDURE NOTES
Peripheral Block    Patient location during procedure: pre-op  Reason for block: post-op pain management and at surgeon's request  Start time: 8/27/2024 10:47 AM  End time: 8/27/2024 10:52 AM  Staffing  Performed: anesthesiologist   Anesthesiologist: Ankit Baez MD  Performed by: Ankit Baez MD  Authorized by: Ankit Baez MD    Preanesthetic Checklist  Completed: patient identified, IV checked, site marked, risks and benefits discussed, surgical/procedural consents, equipment checked, pre-op evaluation, timeout performed, anesthesia consent given, oxygen available and monitors applied/VS acknowledged  Peripheral Block   Patient position: supine  Prep: ChloraPrep  Provider prep: mask and sterile gloves (Sterile probe cover)  Patient monitoring: cardiac monitor, continuous pulse ox, frequent blood pressure checks and IV access  Block type: iPacks  Laterality: left  Injection technique: single-shot  Guidance: ultrasound guided  Local infiltration: lidocaine  Infiltration strength: 1 %  Local infiltration: lidocaine  Dose: 5 mL    Needle   Needle type: combined needle/nerve stimulator   Needle gauge: 22 G  Needle localization: anatomical landmarks and ultrasound guidance  Needle length: 10 cm  Assessment   Injection assessment: negative aspiration for heme, no paresthesia on injection and local visualized surrounding nerve on ultrasound  Paresthesia pain: immediately resolved  Slow fractionated injection: yes  Hemodynamics: stable  Outcomes: uncomplicated and patient tolerated procedure well    Additional Notes  Ultrasound guidance used to view needle for placement.    Ultrasound image stored,  printed and saved in patient chart.    Sterile probe cover used    Medications Administered  ropivacaine (NAROPIN) injection 0.5% - Perineural   25 mL - 8/27/2024 10:52:00 AM  lidocaine injection 1% - Subcuticular   2.5 mL - 8/27/2024 10:47:00 AM

## 2024-08-27 NOTE — ANESTHESIA POSTPROCEDURE EVALUATION
Department of Anesthesiology  Postprocedure Note    Patient: Geovanny Isaac  MRN: [id number]  YOB: 1965  Date of evaluation: 8/27/2024    Procedure Summary       Date: 08/27/24 Room / Location: [address]    Anesthesia Start: 1124 Anesthesia Stop: 1427    Procedure: Left total knee arthroplasty (Left: Knee) Diagnosis:       Osteoarthritis of left knee      (Osteoarthritis of left knee [M17.12])    Surgeons: Leonardo Melton MD Responsible Provider: Ankit Baez MD    Anesthesia Type: MAC, spinal ASA Status: 3            Anesthesia Type: No value filed.    Lali Phase I: Lali Score: 10    Lali Phase II:      Anesthesia Post Evaluation    Patient location during evaluation: bedside  Patient participation: complete - patient participated  Level of consciousness: awake and sleepy but conscious  Pain score: 0  Airway patency: patent  Nausea & Vomiting: no nausea and no vomiting  Cardiovascular status: blood pressure returned to baseline and hemodynamically stable  Respiratory status: acceptable  Hydration status: euvolemic  Pain management: adequate    No notable events documented.

## 2024-08-27 NOTE — ANESTHESIA PRE PROCEDURE
Department of Anesthesiology  Preprocedure Note       Name:  Geovanny Isaac   Age:  59 y.o.  :  1965                                          MRN:  [id number]         Date:  2024      Surgeon: Surgeon(s):  Leonardo Melton MD    Procedure: Procedure(s):  Left total knee arthroplasty,[place] Triathlon Total Knee System,Choice with adductor canal block ZACK IS AWARE    Medications prior to admission:   Prior to Admission medications    Medication Sig Start Date End Date Taking? Authorizing Provider   chlorhexidine gluconate (ANTISEPTIC SKIN CLEANSER) 4 % SOLN external solution Apply topically daily 24  Yes Dutch Ramirez APRN - CNP   cyanocobalamin ([employer] VITAMIN B12) 1000 MCG tablet Take 1 tablet by mouth daily 18  Yes Abimbola Espinosa MD   albuterol sulfate HFA (PROVENTIL;VENTOLIN;PROAIR) 108 (90 Base) MCG/ACT inhaler Inhale 1 puff into the lungs every 4 hours as needed 23  Yes Abimbola Espinosa MD   Ferrous Sulfate 5 MG/20ML SOLN Take 1 tablet by mouth 18  Yes Abimbola Espinosa MD   allopurinol (ZYLOPRIM) 100 MG tablet Take 1 tablet by mouth daily   Yes Abimbola Espinosa MD   vitamin D (CHOLECALCIFEROL) 33400 UNIT CAPS Take 1 capsule by mouth once a week   Yes Abimbola Espinosa MD   doxycycline hyclate (VIBRA-TABS) 100 MG tablet Take 1 tablet by mouth in the morning and 1 tablet in the evening. 22  Yes Abimbola Espinosa MD   gabapentin (NEURONTIN) 600 MG tablet Take 1 tablet by mouth 2 times daily. 3/22/19 8/27/24 Yes Abimbola Espinosa MD   levothyroxine (SYNTHROID) 50 MCG tablet TAKE 1 TABLET ON AN EMPTY STOMACH IN THE MORNING ONCE A DAY 18  Yes Abimbola Espinosa MD   potassium chloride (K-TAB) 20 MEQ TBCR extended release tablet Take 1 tablet by mouth daily 3/22/19 11/12/24 Yes Abimbola Espinosa MD   predniSONE (DELTASONE) 20 MG tablet Take two tablets (40 mg) daily for five days, then take one tablet (20 mg) daily for five days. Take with  History reviewed. No pertinent surgical history.    Social History:    Social History     Tobacco Use    Smoking status: Never     Passive exposure: Never    Smokeless tobacco: Never   Substance Use Topics    Alcohol use: Yes     Comment: Occassionally                                Counseling given: Not Answered      Vital Signs (Current):   Vitals:    08/27/24 0903   BP: 134/79   Pulse: 72   Resp: 16   Temp: 97.4 °F (36.3 °C)   TempSrc: Temporal   SpO2: 99%   Weight: 129.3 kg (285 lb)   Height: 1.829 m (6')                                              BP Readings from Last 3 Encounters:   08/27/24 134/79   08/20/24 126/78       NPO Status: Time of last liquid consumption: 2355                        Time of last solid consumption: 2200                        Date of last liquid consumption: 08/26/24                        Date of last solid food consumption: 08/26/24    BMI:   Wt Readings from Last 3 Encounters:   08/27/24 129.3 kg (285 lb)   08/20/24 129.8 kg (286 lb 3.2 oz)   07/30/24 129.3 kg (285 lb)     Body mass index is 38.65 kg/m².    CBC:   Lab Results   Component Value Date/Time    WBC 10.2 08/20/2024 02:54 PM    RBC 6.08 08/20/2024 02:54 PM    HGB 13.9 08/20/2024 02:54 PM    HCT 44.5 08/20/2024 02:54 PM    MCV 73.2 08/20/2024 02:54 PM    RDW 15.7 08/20/2024 02:54 PM     08/20/2024 02:54 PM       CMP:   Lab Results   Component Value Date/Time     08/20/2024 02:54 PM    K 3.2 08/20/2024 02:54 PM    CL 95 08/20/2024 02:54 PM    CO2 28 08/20/2024 02:54 PM    BUN 41 08/20/2024 02:54 PM    CREATININE 1.83 08/20/2024 02:54 PM    LABGLOM 42.4 08/20/2024 02:54 PM    GLUCOSE 99 08/20/2024 02:54 PM    CALCIUM 9.8 08/20/2024 02:54 PM       POC Tests: No results for input(s): \"POCGLU\", \"POCNA\", \"POCK\", \"POCCL\", \"POCBUN\", \"POCHEMO\", \"POCHCT\" in the last 72 hours.    Coags:   Lab Results   Component Value Date/Time    PROTIME 13.5 08/20/2024 02:54 PM    INR 1.0 08/20/2024 02:54 PM       HCG (If

## 2024-08-27 NOTE — PROGRESS NOTES
Yulia Patrick Initial Discharge Assessment    Met with Patient to discuss discharge plan.        PCP: Carri Bolanos APRN - NP                                  Date of Last Visit: March or April    If no PCP, list provided? N/A    Discharge Planning    Living Arrangements: independently at home    Who do you live with? Girlfriend Mackenzie     Who helps you with your care:  self    If lives at home:     Do you have any barriers navigating in your home? yes - stairs to bedroom and bathroom.      Patient can perform ADL?  Yes    Current Services (outpatient and in home) :  None    Dialysis: No    Is transportation available to get to your appointments? Yes    DME Equipment:  yes - walker, and shower chair.  Patient identifies no further DME needs     Respiratory equipment: None    Respiratory provider:  no     Pharmacy:  yes - [facility] on [address] in [place]    Able to afford cost of medication: Yes    Does patient feel safe at home? Yes    Does patient identify any home going needs? Yes, home health     Patient agreeable to HHC?      Yes, [facility]     Patient agreeable to SNF/Rehab?     N/A    Other discharge needs identified?      N/A    Was Freedom of Choice Provided?      Yes    Initial Discharge Plan? (Note: please see concurrent daily documentation for any updates after initial note).      Chart reviewed.  Patient was admitted to [facility] under observation status post left total knee replacement.  Patient was evaluated by PT and PT recommending HHC upon DC.  This  met with patient to discuss DC planning and help at home needs.  Upon visit patient is alert and laying in hospital bed with head of bed elevated.  Patient appears well groomed with short kept hair and facial hair.  Patient observed wearing eye glasses and hospital attire.  Patient is alert and oriented to person, place, and situation.  Mood is calm and cooperative.  Patient reports residing at home with girlfriend

## 2024-08-28 VITALS
TEMPERATURE: 98.6 F | OXYGEN SATURATION: 99 % | SYSTOLIC BLOOD PRESSURE: 100 MMHG | WEIGHT: 285 LBS | HEIGHT: 72 IN | HEART RATE: 80 BPM | RESPIRATION RATE: 18 BRPM | BODY MASS INDEX: 38.6 KG/M2 | DIASTOLIC BLOOD PRESSURE: 55 MMHG

## 2024-08-28 LAB
ANION GAP SERPL CALCULATED.3IONS-SCNC: 10 MEQ/L (ref 9–15)
BUN SERPL-MCNC: 43 MG/DL (ref 6–20)
CALCIUM SERPL-MCNC: 8.6 MG/DL (ref 8.5–9.9)
CHLORIDE SERPL-SCNC: 96 MEQ/L (ref 95–107)
CO2 SERPL-SCNC: 29 MEQ/L (ref 20–31)
CREAT SERPL-MCNC: 1.85 MG/DL (ref 0.7–1.2)
ERYTHROCYTE [DISTWIDTH] IN BLOOD BY AUTOMATED COUNT: 14.7 % (ref 11.6–14.4)
GLUCOSE SERPL-MCNC: 98 MG/DL (ref 70–99)
HCT VFR BLD AUTO: 31.4 % (ref 42–52)
HGB BLD-MCNC: 9.7 G/DL (ref 13.7–17.5)
MAGNESIUM SERPL-MCNC: 2.3 MG/DL (ref 1.7–2.4)
MCH RBC QN AUTO: 22.9 PG (ref 25.7–32.2)
MCHC RBC AUTO-ENTMCNC: 30.9 % (ref 32.3–36.5)
MCV RBC AUTO: 74.1 FL (ref 79–92.2)
PLATELET # BLD AUTO: 298 K/UL (ref 163–337)
POTASSIUM SERPL-SCNC: 3.5 MEQ/L (ref 3.4–4.9)
RBC # BLD AUTO: 4.24 M/UL (ref 4.63–6.08)
SODIUM SERPL-SCNC: 135 MEQ/L (ref 135–144)
URATE SERPL-MCNC: 7.2 MG/DL (ref 3.4–7)
WBC # BLD AUTO: 13 K/UL (ref 4.2–9)

## 2024-08-28 PROCEDURE — 83735 ASSAY OF MAGNESIUM: CPT

## 2024-08-28 PROCEDURE — 6360000002 HC RX W HCPCS: Performed by: NURSE PRACTITIONER

## 2024-08-28 PROCEDURE — 97110 THERAPEUTIC EXERCISES: CPT

## 2024-08-28 PROCEDURE — 2580000003 HC RX 258: Performed by: NURSE PRACTITIONER

## 2024-08-28 PROCEDURE — 97116 GAIT TRAINING THERAPY: CPT

## 2024-08-28 PROCEDURE — 2580000003 HC RX 258: Performed by: INTERNAL MEDICINE

## 2024-08-28 PROCEDURE — 84550 ASSAY OF BLOOD/URIC ACID: CPT

## 2024-08-28 PROCEDURE — G0378 HOSPITAL OBSERVATION PER HR: HCPCS

## 2024-08-28 PROCEDURE — 6370000000 HC RX 637 (ALT 250 FOR IP): Performed by: INTERNAL MEDICINE

## 2024-08-28 PROCEDURE — 97530 THERAPEUTIC ACTIVITIES: CPT

## 2024-08-28 PROCEDURE — 97166 OT EVAL MOD COMPLEX 45 MIN: CPT

## 2024-08-28 PROCEDURE — 36415 COLL VENOUS BLD VENIPUNCTURE: CPT

## 2024-08-28 PROCEDURE — 96376 TX/PRO/DX INJ SAME DRUG ADON: CPT

## 2024-08-28 PROCEDURE — 85027 COMPLETE CBC AUTOMATED: CPT

## 2024-08-28 PROCEDURE — 96375 TX/PRO/DX INJ NEW DRUG ADDON: CPT

## 2024-08-28 PROCEDURE — 6370000000 HC RX 637 (ALT 250 FOR IP): Performed by: NURSE PRACTITIONER

## 2024-08-28 PROCEDURE — 80048 BASIC METABOLIC PNL TOTAL CA: CPT

## 2024-08-28 PROCEDURE — 96374 THER/PROPH/DIAG INJ IV PUSH: CPT

## 2024-08-28 RX ORDER — ASPIRIN 81 MG/1
81 TABLET ORAL 2 TIMES DAILY
Qty: 60 TABLET | Refills: 0 | Status: SHIPPED | OUTPATIENT
Start: 2024-08-28 | End: 2024-09-27

## 2024-08-28 RX ORDER — OXYCODONE AND ACETAMINOPHEN 5; 325 MG/1; MG/1
1 TABLET ORAL EVERY 4 HOURS PRN
Qty: 12 TABLET | Refills: 0 | Status: SHIPPED | OUTPATIENT
Start: 2024-08-28 | End: 2024-08-28

## 2024-08-28 RX ORDER — HYDROXYZINE HYDROCHLORIDE 10 MG/1
10 TABLET, FILM COATED ORAL 3 TIMES DAILY
Qty: 30 TABLET | Refills: 0 | Status: SHIPPED | OUTPATIENT
Start: 2024-08-28 | End: 2024-09-07

## 2024-08-28 RX ORDER — 0.9 % SODIUM CHLORIDE 0.9 %
500 INTRAVENOUS SOLUTION INTRAVENOUS ONCE
Status: COMPLETED | OUTPATIENT
Start: 2024-08-28 | End: 2024-08-28

## 2024-08-28 RX ORDER — OXYCODONE AND ACETAMINOPHEN 5; 325 MG/1; MG/1
1 TABLET ORAL EVERY 4 HOURS PRN
Qty: 20 TABLET | Refills: 0 | Status: SHIPPED | OUTPATIENT
Start: 2024-08-28 | End: 2024-09-04

## 2024-08-28 RX ORDER — OXYCODONE AND ACETAMINOPHEN 5; 325 MG/1; MG/1
1 TABLET ORAL EVERY 4 HOURS PRN
Qty: 40 TABLET | Refills: 0 | Status: SHIPPED | OUTPATIENT
Start: 2024-08-28 | End: 2024-08-28

## 2024-08-28 RX ORDER — CYCLOBENZAPRINE HCL 10 MG
10 TABLET ORAL 3 TIMES DAILY PRN
Qty: 30 TABLET | Refills: 0 | Status: SHIPPED | OUTPATIENT
Start: 2024-08-28 | End: 2024-09-07

## 2024-08-28 RX ORDER — SENNA AND DOCUSATE SODIUM 50; 8.6 MG/1; MG/1
1 TABLET, FILM COATED ORAL 2 TIMES DAILY
Qty: 60 TABLET | Refills: 0 | Status: SHIPPED | OUTPATIENT
Start: 2024-08-28 | End: 2024-09-27

## 2024-08-28 RX ADMIN — SODIUM CHLORIDE 3000 MG: 9 INJECTION, SOLUTION INTRAVENOUS at 04:22

## 2024-08-28 RX ADMIN — OXYCODONE 5 MG: 5 TABLET ORAL at 04:27

## 2024-08-28 RX ADMIN — ASPIRIN 81 MG: 81 TABLET, COATED ORAL at 07:57

## 2024-08-28 RX ADMIN — CYCLOBENZAPRINE 10 MG: 10 TABLET, FILM COATED ORAL at 14:01

## 2024-08-28 RX ADMIN — MORPHINE SULFATE 2 MG: 2 INJECTION, SOLUTION INTRAMUSCULAR; INTRAVENOUS at 07:56

## 2024-08-28 RX ADMIN — LEVOTHYROXINE SODIUM 50 MCG: 0.03 TABLET ORAL at 05:38

## 2024-08-28 RX ADMIN — ACETAMINOPHEN 650 MG: 325 TABLET ORAL at 07:57

## 2024-08-28 RX ADMIN — OXYCODONE 5 MG: 5 TABLET ORAL at 11:17

## 2024-08-28 RX ADMIN — SODIUM CHLORIDE: 9 INJECTION, SOLUTION INTRAVENOUS at 02:06

## 2024-08-28 RX ADMIN — KETOROLAC TROMETHAMINE 7.5 MG: 15 INJECTION, SOLUTION INTRAMUSCULAR; INTRAVENOUS at 05:38

## 2024-08-28 RX ADMIN — HYDROXYZINE HYDROCHLORIDE 10 MG: 10 TABLET ORAL at 14:00

## 2024-08-28 RX ADMIN — ACETAMINOPHEN 650 MG: 325 TABLET ORAL at 14:01

## 2024-08-28 RX ADMIN — KETOROLAC TROMETHAMINE 7.5 MG: 15 INJECTION, SOLUTION INTRAMUSCULAR; INTRAVENOUS at 11:17

## 2024-08-28 RX ADMIN — ALLOPURINOL 100 MG: 100 TABLET ORAL at 07:58

## 2024-08-28 RX ADMIN — SODIUM CHLORIDE 500 ML: 9 INJECTION, SOLUTION INTRAVENOUS at 08:25

## 2024-08-28 RX ADMIN — HYDROXYZINE HYDROCHLORIDE 10 MG: 10 TABLET ORAL at 07:57

## 2024-08-28 RX ADMIN — OXYCODONE 5 MG: 5 TABLET ORAL at 00:41

## 2024-08-28 RX ADMIN — FERROUS SULFATE TAB 325 MG (65 MG ELEMENTAL FE) 325 MG: 325 (65 FE) TAB at 07:57

## 2024-08-28 RX ADMIN — POTASSIUM CHLORIDE 20 MEQ: 750 TABLET, EXTENDED RELEASE ORAL at 07:56

## 2024-08-28 RX ADMIN — SENNOSIDES AND DOCUSATE SODIUM 1 TABLET: 50; 8.6 TABLET ORAL at 07:57

## 2024-08-28 RX ADMIN — ACETAMINOPHEN 650 MG: 325 TABLET ORAL at 04:19

## 2024-08-28 RX ADMIN — GABAPENTIN 600 MG: 300 CAPSULE ORAL at 07:56

## 2024-08-28 ASSESSMENT — PAIN DESCRIPTION - LOCATION
LOCATION: KNEE

## 2024-08-28 ASSESSMENT — PAIN DESCRIPTION - DESCRIPTORS
DESCRIPTORS: ACHING
DESCRIPTORS: ACHING;PRESSURE
DESCRIPTORS: ACHING;PRESSURE
DESCRIPTORS: SHOOTING
DESCRIPTORS: ACHING
DESCRIPTORS: ACHING

## 2024-08-28 ASSESSMENT — PAIN SCALES - GENERAL
PAINLEVEL_OUTOF10: 5
PAINLEVEL_OUTOF10: 10
PAINLEVEL_OUTOF10: 6
PAINLEVEL_OUTOF10: 7
PAINLEVEL_OUTOF10: 5
PAINLEVEL_OUTOF10: 5
PAINLEVEL_OUTOF10: 9

## 2024-08-28 ASSESSMENT — PAIN DESCRIPTION - ORIENTATION
ORIENTATION: LEFT

## 2024-08-28 ASSESSMENT — PAIN - FUNCTIONAL ASSESSMENT
PAIN_FUNCTIONAL_ASSESSMENT: ACTIVITIES ARE NOT PREVENTED
PAIN_FUNCTIONAL_ASSESSMENT: PREVENTS OR INTERFERES SOME ACTIVE ACTIVITIES AND ADLS
PAIN_FUNCTIONAL_ASSESSMENT: ACTIVITIES ARE NOT PREVENTED

## 2024-08-28 NOTE — CONSULTS
Consult      Patient:  Geovanny Isaac  YOB: 1965    MRN: [id number]     Acct: [id number]    Primary Care Physician: Carri Bolanos APRN - NP    HISTORY OF PRESENT ILLNESS:   History obtained from chart review and the patient.    The patient is a 59 y.o. male whom I have been requested to see by Dr. Melton for evaluation of HTN/CKD/medical management. Patient has history L knee pain due to OA, failure outpatient therapy and came for scheduled LTKA and was evaluated in POD. Denied fever/dyspnea, CP. Never was told regarding CKD. Has gout x 2 yrs- not able to find uric acid drawn in the past. Taking allopurinol/steroids for resent gout exacerbation     Past Medical History:  History reviewed. No pertinent past medical history.    Past Surgical History:        Procedure Laterality Date    TOTAL KNEE ARTHROPLASTY Left 8/27/2024    Left total knee arthroplasty performed by Leonardo Melton MD at [facility] OR       Medications:    No current facility-administered medications on file prior to encounter.     Current Outpatient Medications on File Prior to Encounter   Medication Sig Dispense Refill    cyanocobalamin ([employer] VITAMIN B12) 1000 MCG tablet Take 1 tablet by mouth daily      albuterol sulfate HFA (PROVENTIL;VENTOLIN;PROAIR) 108 (90 Base) MCG/ACT inhaler Inhale 1 puff into the lungs every 4 hours as needed      Ferrous Sulfate 5 MG/20ML SOLN Take 1 tablet by mouth      allopurinol (ZYLOPRIM) 100 MG tablet Take 1 tablet by mouth daily      vitamin D (CHOLECALCIFEROL) 43239 UNIT CAPS Take 1 capsule by mouth once a week      doxycycline hyclate (VIBRA-TABS) 100 MG tablet Take 1 tablet by mouth in the morning and 1 tablet in the evening.      gabapentin (NEURONTIN) 600 MG tablet Take 1 tablet by mouth 2 times daily.      levothyroxine (SYNTHROID) 50 MCG tablet TAKE 1 TABLET ON AN EMPTY STOMACH IN THE MORNING ONCE A DAY      potassium chloride (K-TAB) 20 MEQ TBCR extended release tablet Take 1 tablet by mouth

## 2024-08-28 NOTE — PROGRESS NOTES
Pt a and o x4. Pleasant. Left knee wound vac in place and C,D,I. Pulses palpable.Pt reports 9/10 pain @ 0800. Not due for halle yet. Requests IV morphine. B.p assessed and was 105/54.Morphine given. 20 min later pt stated he felt light headed when standing. B.p assessed and was 76/42. MD made aware and new rx for bolus was given. B.p after bolus was 112/65. Pt no longer dizzy or light headed. Uric acid ordered and added on to AM labs. Pt up with 1 CGA and WW. Wife at bedside. Pain controlled with prn Halle through out rest of shift. DC order written. Fluids stopped. MYRIAM complete. AVS printed and gone over with pt and wife. IV removed.     1440- RX given for percocet handed to pt. Pt wheeled down to front door by aide.

## 2024-08-28 NOTE — PLAN OF CARE
Problem: Discharge Planning  Goal: Discharge to home or other facility with appropriate resources  8/28/2024 1159 by Savanna Zhang RN  Outcome: Completed  8/28/2024 0151 by Fina Jessica RN  Outcome: Progressing     Problem: Pain  Goal: Verbalizes/displays adequate comfort level or baseline comfort level  8/28/2024 1159 by Savanna Zhang RN  Outcome: Completed  8/28/2024 0151 by Fina Jessica RN  Outcome: Progressing     Problem: Safety - Adult  Goal: Free from fall injury  8/28/2024 1159 by Savanna Zhang RN  Outcome: Completed  8/28/2024 0151 by Fina Jessica RN  Outcome: Progressing     Problem: ABCDS Injury Assessment  Goal: Absence of physical injury  8/28/2024 1159 by Savanna Zhang RN  Outcome: Completed  8/28/2024 0151 by Fina Jessica RN  Outcome: Progressing     Problem: Pain  Goal: Verbalizes/displays adequate comfort level or baseline comfort level  8/28/2024 1159 by Savanna Zhang RN  Outcome: Completed  8/28/2024 0151 by Fina Jessica RN  Outcome: Progressing     Problem: Safety - Adult  Goal: Free from fall injury  8/28/2024 1159 by Savanna Zhang RN  Outcome: Completed  8/28/2024 0151 by Fina Jessica RN  Outcome: Progressing     Problem: ABCDS Injury Assessment  Goal: Absence of physical injury  8/28/2024 1159 by Savanna Zhang RN  Outcome: Completed  8/28/2024 0151 by Fina Jessica RN  Outcome: Progressing

## 2024-08-28 NOTE — PLAN OF CARE
Problem: Discharge Planning  Goal: Discharge to home or other facility with appropriate resources  8/28/2024 0151 by Fina Jessica, RN  Outcome: Progressing  8/27/2024 1649 by Shala Toro RN  Outcome: Progressing     Problem: Pain  Goal: Verbalizes/displays adequate comfort level or baseline comfort level  Outcome: Progressing     Problem: Safety - Adult  Goal: Free from fall injury  Outcome: Progressing     Problem: ABCDS Injury Assessment  Goal: Absence of physical injury  Outcome: Progressing

## 2024-08-28 NOTE — PROGRESS NOTES
Physical Therapy  Facility/Department: [facility] MED SURG UNIT  Daily Treatment Note  NAME: Geovanny Isaac  : 1965  MRN: [id number]    Date of Service: 2024    Discharge Recommendations:  Continue to assess pending progress, Home with Home health PT        Patient Diagnosis(es): The primary encounter diagnosis was Status post total left knee replacement. Diagnoses of Osteoarthritis of left knee and Post-op pain were also pertinent to this visit.    Assessment  Assessment: (P) Pt. tolerates ambulation and stair training without LOB and appears stedy. Pt. limited with L LE weight bear; hjowever, pt. able to demo good effort to tolerate L LE weight bear with use of B UE support as needed to amb. Pt. demo's controlled tsf skills. Seated L LE ther ex performed with pain increase to 8/10. CP post with assisting pt up in bed.  Activity Tolerance: (P) Patient limited by pain    Plan  Physical Therapy Plan  General Plan: 5-7 times per week (1-2)  Current Treatment Recommendations: Strengthening;ROM;Balance training;Functional mobility training;Gait training;Stair training;Home exercise program;Patient/Caregiver education & training    Restrictions  Restrictions/Precautions  Restrictions/Precautions: Surgical Protocols, Weight Bearing  Required Braces or Orthoses?: No  Lower Extremity Weight Bearing Restrictions  Left Lower Extremity Weight Bearing: Weight Bearing As Tolerated  Position Activity Restriction  Other position/activity restrictions: Pt has wound vac L knee     Subjective   Subjective  Subjective: Pt. reports L knee soreness 6/10 at arrival. Pt. resting in recliner with L LE elevated. Pt. c/o dizziness with first initial stand.  Pain: 6/10 L knee and thigh  Orientation  Overall Orientation Status: Within Normal Limits  Orientation Level: Oriented X4  Cognition  Overall Cognitive Status: WNL  Cognition Comment: Alert, pleasant, cooperative    Objective  Vitals     Bed Mobility Training  Bed Mobility Training:

## 2024-08-28 NOTE — PROGRESS NOTES
Department of Orthopedic Surgery  Joint Service  Attending Progress Note        Subjective:  No complaints    Vitals  VITALS:  BP (!) 100/55   Pulse 80   Temp 98.6 °F (37 °C) (Oral)   Resp 18   Ht 1.829 m (6')   Wt 129.3 kg (285 lb)   SpO2 99%   BMI 38.65 kg/m²     PHYSICAL EXAM:    Orientation:  alert and oriented to person, place and time    Left Lower Extremity    Incision:  dressing in place, clean, dry, and intact    Lower Extremity Motor :   Quadriceps:  5/5  Extensor hallucis:  5/5  Dorsiflexion:  5/5  Plantarflexion:  5/5    Lower Extremity Sensory:  Tibial Nerve:  intact  Superficial Peroneal Nerve:  intact  Deep Peroneal Nerve:  intact    Pulses:    Posterior Tibial:  2  Dorsalis Pedis:  2  Posterior Tibial Artery:  2    Abnormal Exam findings:  none    Brace:  no    LABS:    HgB:    Lab Results   Component Value Date/Time    HGB 9.7 08/28/2024 06:15 AM     INR:  No results found for: \"PTINR\"  CMP:    Lab Results   Component Value Date/Time     08/28/2024 06:16 AM    K 3.5 08/28/2024 06:16 AM    CL 96 08/28/2024 06:16 AM    CO2 29 08/28/2024 06:16 AM    BUN 43 08/28/2024 06:16 AM    CREATININE 1.85 08/28/2024 06:16 AM    LABGLOM 41.8 08/28/2024 06:16 AM    GLUCOSE 98 08/28/2024 06:16 AM    CALCIUM 8.6 08/28/2024 06:16 AM     BMP:    Lab Results   Component Value Date/Time     08/28/2024 06:16 AM    K 3.5 08/28/2024 06:16 AM    CL 96 08/28/2024 06:16 AM    CO2 29 08/28/2024 06:16 AM    BUN 43 08/28/2024 06:16 AM    CREATININE 1.85 08/28/2024 06:16 AM    CALCIUM 8.6 08/28/2024 06:16 AM    LABGLOM 41.8 08/28/2024 06:16 AM    GLUCOSE 98 08/28/2024 06:16 AM       ASSESSMENT AND PLAN:    Post operative day 1 status post left total knee arthroplasty    1:  Weight bearing as tolerated  2:  Deep venous thrombosis prophylaxis - ASA  3:  Continue physical therapy  4:  D/C Plan:  Home Health  5:  Pain Control:  good  6:  D/C home if passes PT

## 2024-08-28 NOTE — PROGRESS NOTES
Chart reviewed.  Patient's care discussed in daily quality rounds.  Plan is for patient to DC back home with girlfriend Mackenzie assisting with patient's care and transportation needs along with [facility] following.  This  spoke with [facility] liaison Ciara on 8/27 and Ciara confirmed [facility] can follow.  This  left confidential voice mail for Ciara on this date confirming patient is being DC home today and continues to desire [facility].    MYRIAM completed.  No further follow up from  requested

## 2024-08-28 NOTE — PROGRESS NOTES
Facility/Department: [facility] MED SURG UNIT  Occupational Therapy Initial Assessment    Name: Geovanny Isaac  : 1965  MRN: [id number]  Date of Service: 2024    Discharge Recommendations:  Continue to assess pending progress, Home with Home health OT          Patient Diagnosis(es): The primary encounter diagnosis was Status post total left knee replacement. A diagnosis of Osteoarthritis of left knee was also pertinent to this visit.  Past Medical History:  has no past medical history on file.  Past Surgical History:  has a past surgical history that includes Total knee arthroplasty (Left, 2024).    Treatment Diagnosis: Decreased ADL/IADL performance d/t L TKR      Assessment  Performance deficits / Impairments: Decreased functional mobility ;Decreased endurance;Decreased ADL status;Decreased balance;Decreased strength;Decreased safe awareness;Decreased high-level IADLs  Assessment: Pt is 60 y/o M admitted to [facility] for elective L TKR with Dr. Melton on 24, PLOF pt is from home with significant other, was not using a device for mobility, no recent falls. ADL/IADL performance Mod I. OT eval completed with pt tolerating a sponge bath, could not shower d/t wound vac, pt had low BP and was limited by lightheadedness. RN present who is addressing. Anticipate pt can return home later with [facility] services and begin outpatient PT when appropriate.  Treatment Diagnosis: Decreased ADL/IADL performance d/t L TKR  REQUIRES OT FOLLOW-UP: Yes  Activity Tolerance  Activity Tolerance: Patient limited by fatigue;Patient limited by pain     Plan  Occupational Therapy Plan  Times Per Week: 4-7  Times Per Day: Once a day  Current Treatment Recommendations: Strengthening, Balance training, Safety education & training, Self-Care / ADL, Functional mobility training, Patient/Caregiver education & training, Endurance training, Equipment evaluation, education, & procurement,  I  Short Term Goal 2: Bathe UB/LB Mod I  Short Term Goal 3: Complete toileting Mod I  Short Term Goal 4: Complete functional mobility and transfers Mod I  Short Term Goal 5: Demo BUE HEP I  Patient Goals   Patient goals : \"I hope to go home later today.\"      Therapy Time   Individual Concurrent Group Co-treatment   Time In  7:45am         Time Out  8:20am         Minutes  35                 Amanda Reyna, [id number]

## 2024-08-30 ENCOUNTER — TELEPHONE (OUTPATIENT)
Dept: ORTHOPEDIC SURGERY | Age: 59
End: 2024-08-30

## 2024-08-30 DIAGNOSIS — Z96.652 PRESENCE OF TOTAL KNEE JOINT PROSTHESIS, LEFT: Primary | ICD-10-CM

## 2024-08-30 RX ORDER — ACETAMINOPHEN 500 MG
1000 TABLET ORAL 3 TIMES DAILY
Qty: 42 TABLET | Refills: 1 | Status: SHIPPED | OUTPATIENT
Start: 2024-08-30 | End: 2024-09-06

## 2024-08-30 RX ORDER — CELECOXIB 100 MG/1
100 CAPSULE ORAL 2 TIMES DAILY
Qty: 60 CAPSULE | Refills: 0 | Status: SHIPPED | OUTPATIENT
Start: 2024-08-30

## 2024-08-30 RX ORDER — OXYCODONE HYDROCHLORIDE 5 MG/1
5 TABLET ORAL EVERY 6 HOURS PRN
Qty: 28 TABLET | Refills: 0 | Status: SHIPPED | OUTPATIENT
Start: 2024-08-30 | End: 2024-09-06

## 2024-08-30 NOTE — TELEPHONE ENCOUNTER
Patient just had knee surgery on 8/28/24 only was given quantity of 20 tablets of Percocet for pain. Patient pain level is a 10 would like another script for Percocet only has 4 pills left. Please advise.

## 2024-09-12 ENCOUNTER — OFFICE VISIT (OUTPATIENT)
Dept: ORTHOPEDIC SURGERY | Age: 59
End: 2024-09-12

## 2024-09-12 VITALS
OXYGEN SATURATION: 96 % | HEART RATE: 118 BPM | WEIGHT: 285 LBS | TEMPERATURE: 98.4 F | BODY MASS INDEX: 38.6 KG/M2 | HEIGHT: 72 IN

## 2024-09-12 DIAGNOSIS — Z96.652 PRESENCE OF TOTAL KNEE JOINT PROSTHESIS, LEFT: Primary | ICD-10-CM

## 2024-09-12 PROCEDURE — 99024 POSTOP FOLLOW-UP VISIT: CPT | Performed by: ORTHOPAEDIC SURGERY

## 2024-09-12 RX ORDER — OXYCODONE HYDROCHLORIDE 5 MG/1
5 TABLET ORAL EVERY 6 HOURS PRN
Qty: 28 TABLET | Refills: 0 | Status: SHIPPED | OUTPATIENT
Start: 2024-09-12 | End: 2024-09-19

## 2024-09-16 ENCOUNTER — HOSPITAL ENCOUNTER (OUTPATIENT)
Dept: CT IMAGING | Age: 59
Discharge: HOME | End: 2024-09-18
Attending: ORTHOPAEDIC SURGERY
Payer: MEDICARE

## 2024-09-16 ENCOUNTER — HOSPITAL ENCOUNTER (OUTPATIENT)
Dept: GENERAL RADIOLOGY | Age: 59
Discharge: HOME | End: 2024-09-18
Attending: ORTHOPAEDIC SURGERY
Payer: MEDICARE

## 2024-09-16 ENCOUNTER — HOSPITAL ENCOUNTER (OUTPATIENT)
Dept: MRI IMAGING | Age: 59
Discharge: HOME | End: 2024-09-18
Attending: ORTHOPAEDIC SURGERY
Payer: MEDICARE

## 2024-09-16 ENCOUNTER — TELEPHONE (OUTPATIENT)
Dept: ORTHOPEDIC SURGERY | Age: 59
End: 2024-09-16

## 2024-09-16 DIAGNOSIS — D49.89: ICD-10-CM

## 2024-09-16 DIAGNOSIS — D49.89: Primary | ICD-10-CM

## 2024-09-16 DIAGNOSIS — C79.51 CANCER, METASTATIC TO BONE (HCC): Primary | ICD-10-CM

## 2024-09-16 DIAGNOSIS — F41.9 ANXIETY DUE TO INVASIVE PROCEDURE: Primary | ICD-10-CM

## 2024-09-16 PROCEDURE — 71250 CT THORAX DX C-: CPT

## 2024-09-16 PROCEDURE — 73718 MRI LOWER EXTREMITY W/O DYE: CPT

## 2024-09-16 PROCEDURE — 71046 X-RAY EXAM CHEST 2 VIEWS: CPT

## 2024-09-16 RX ORDER — ALPRAZOLAM 0.5 MG
TABLET ORAL
Qty: 2 TABLET | Refills: 0 | Status: SHIPPED | OUTPATIENT
Start: 2024-09-16 | End: 2024-09-16

## 2024-09-23 DIAGNOSIS — Z96.652 PRESENCE OF TOTAL KNEE JOINT PROSTHESIS, LEFT: Primary | ICD-10-CM

## 2024-09-23 RX ORDER — OXYCODONE HYDROCHLORIDE 5 MG/1
5 TABLET ORAL EVERY 6 HOURS PRN
Qty: 28 TABLET | Refills: 0 | Status: SHIPPED | OUTPATIENT
Start: 2024-09-23 | End: 2024-09-30

## (undated) DEVICE — NEEDLE SPNL 20GA L3 1 2IN YEL S STL POLYCARB HUB MTL STYL

## (undated) DEVICE — TOTAL KNEE: Brand: MEDLINE INDUSTRIES, INC.

## (undated) DEVICE — GLOVE SURG SZ 9 THK91MIL LTX FREE SYN POLYISOPRENE ANTI

## (undated) DEVICE — Device: Brand: STABLECUT®

## (undated) DEVICE — ELECTRODE ES L275IN 275IN BLDE TIP COAT PTFE TEF W EVAC

## (undated) DEVICE — DRESSING HYDROFIBER AQUACEL AG ADVANTAGE 3.5X12 IN

## (undated) DEVICE — GLOVE ORANGE PI 8 1/2   MSG9085

## (undated) DEVICE — SUTURE ETHIBOND EXCEL SZ 1 L30IN NONABSORBABLE GRN L48MM CTX X865H

## (undated) DEVICE — SUTURE VICRYL SZ 1 L36IN ABSRB UD L36MM CT-1 1/2 CIR J947H

## (undated) DEVICE — SUTURE VICRYL SZ 2-0 L36IN ABSRB UD L36MM CT-1 1/2 CIR J945H

## (undated) DEVICE — 4-PORT MANIFOLD: Brand: NEPTUNE 2

## (undated) DEVICE — SYRINGE MED 50ML LUERLOCK TIP

## (undated) DEVICE — BANDAGE COBAN 6 IN WND 6INX5YD FOAM

## (undated) DEVICE — HOOD: Brand: T7PLUS

## (undated) DEVICE — MAT FLR ABSRB ECODRI-SAFE

## (undated) DEVICE — SUTURE MONOCRYL STRATAFIX SPRL + SZ 3-0 L18IN ABSRB UD PS-2 SXMP1B107

## (undated) DEVICE — BIPOLAR SEALER 23-112-1 AQM 6.0: Brand: AQUAMANTYS ®